# Patient Record
Sex: MALE | Race: OTHER | HISPANIC OR LATINO | URBAN - METROPOLITAN AREA
[De-identification: names, ages, dates, MRNs, and addresses within clinical notes are randomized per-mention and may not be internally consistent; named-entity substitution may affect disease eponyms.]

---

## 2020-10-06 ENCOUNTER — INPATIENT (INPATIENT)
Facility: HOSPITAL | Age: 50
LOS: 0 days | Discharge: HOME | DRG: 287 | End: 2020-10-06
Attending: INTERNAL MEDICINE | Admitting: INTERNAL MEDICINE
Payer: SELF-PAY

## 2020-10-06 ENCOUNTER — TRANSCRIPTION ENCOUNTER (OUTPATIENT)
Age: 50
End: 2020-10-06

## 2020-10-06 VITALS
HEIGHT: 64 IN | TEMPERATURE: 98 F | HEART RATE: 93 BPM | OXYGEN SATURATION: 98 % | DIASTOLIC BLOOD PRESSURE: 114 MMHG | RESPIRATION RATE: 20 BRPM | SYSTOLIC BLOOD PRESSURE: 151 MMHG | WEIGHT: 179.9 LBS

## 2020-10-06 VITALS
SYSTOLIC BLOOD PRESSURE: 148 MMHG | RESPIRATION RATE: 21 BRPM | OXYGEN SATURATION: 97 % | DIASTOLIC BLOOD PRESSURE: 80 MMHG | HEART RATE: 98 BPM

## 2020-10-06 DIAGNOSIS — I20.0 UNSTABLE ANGINA: ICD-10-CM

## 2020-10-06 DIAGNOSIS — Z98.89 OTHER SPECIFIED POSTPROCEDURAL STATES: Chronic | ICD-10-CM

## 2020-10-06 LAB
ALBUMIN SERPL ELPH-MCNC: 4.5 G/DL — SIGNIFICANT CHANGE UP (ref 3.3–5.2)
ALP SERPL-CCNC: 106 U/L — SIGNIFICANT CHANGE UP (ref 40–120)
ALT FLD-CCNC: 93 U/L — HIGH
ANION GAP SERPL CALC-SCNC: 15 MMOL/L — SIGNIFICANT CHANGE UP (ref 5–17)
APTT BLD: 33 SEC — SIGNIFICANT CHANGE UP (ref 27.5–35.5)
AST SERPL-CCNC: 71 U/L — HIGH
BASOPHILS # BLD AUTO: 0.1 K/UL — SIGNIFICANT CHANGE UP (ref 0–0.2)
BASOPHILS NFR BLD AUTO: 1.4 % — SIGNIFICANT CHANGE UP (ref 0–2)
BILIRUB SERPL-MCNC: 0.4 MG/DL — SIGNIFICANT CHANGE UP (ref 0.4–2)
BLD GP AB SCN SERPL QL: SIGNIFICANT CHANGE UP
BUN SERPL-MCNC: 6 MG/DL — LOW (ref 8–20)
CALCIUM SERPL-MCNC: 9.6 MG/DL — SIGNIFICANT CHANGE UP (ref 8.6–10.2)
CHLORIDE SERPL-SCNC: 98 MMOL/L — SIGNIFICANT CHANGE UP (ref 98–107)
CO2 SERPL-SCNC: 24 MMOL/L — SIGNIFICANT CHANGE UP (ref 22–29)
CREAT SERPL-MCNC: 0.77 MG/DL — SIGNIFICANT CHANGE UP (ref 0.5–1.3)
EOSINOPHIL # BLD AUTO: 0.13 K/UL — SIGNIFICANT CHANGE UP (ref 0–0.5)
EOSINOPHIL NFR BLD AUTO: 1.8 % — SIGNIFICANT CHANGE UP (ref 0–6)
GLUCOSE SERPL-MCNC: 137 MG/DL — HIGH (ref 70–99)
HCT VFR BLD CALC: 36.4 % — LOW (ref 39–50)
HGB BLD-MCNC: 11.8 G/DL — LOW (ref 13–17)
IMM GRANULOCYTES NFR BLD AUTO: 0.1 % — SIGNIFICANT CHANGE UP (ref 0–1.5)
INR BLD: 1.1 RATIO — SIGNIFICANT CHANGE UP (ref 0.88–1.16)
LYMPHOCYTES # BLD AUTO: 2.52 K/UL — SIGNIFICANT CHANGE UP (ref 1–3.3)
LYMPHOCYTES # BLD AUTO: 35.3 % — SIGNIFICANT CHANGE UP (ref 13–44)
MCHC RBC-ENTMCNC: 25.9 PG — LOW (ref 27–34)
MCHC RBC-ENTMCNC: 32.4 GM/DL — SIGNIFICANT CHANGE UP (ref 32–36)
MCV RBC AUTO: 80 FL — SIGNIFICANT CHANGE UP (ref 80–100)
MONOCYTES # BLD AUTO: 0.41 K/UL — SIGNIFICANT CHANGE UP (ref 0–0.9)
MONOCYTES NFR BLD AUTO: 5.7 % — SIGNIFICANT CHANGE UP (ref 2–14)
NEUTROPHILS # BLD AUTO: 3.97 K/UL — SIGNIFICANT CHANGE UP (ref 1.8–7.4)
NEUTROPHILS NFR BLD AUTO: 55.7 % — SIGNIFICANT CHANGE UP (ref 43–77)
PLATELET # BLD AUTO: 293 K/UL — SIGNIFICANT CHANGE UP (ref 150–400)
POTASSIUM SERPL-MCNC: 4.2 MMOL/L — SIGNIFICANT CHANGE UP (ref 3.5–5.3)
POTASSIUM SERPL-SCNC: 4.2 MMOL/L — SIGNIFICANT CHANGE UP (ref 3.5–5.3)
PROT SERPL-MCNC: 8.1 G/DL — SIGNIFICANT CHANGE UP (ref 6.6–8.7)
PROTHROM AB SERPL-ACNC: 12.7 SEC — SIGNIFICANT CHANGE UP (ref 10.6–13.6)
RBC # BLD: 4.55 M/UL — SIGNIFICANT CHANGE UP (ref 4.2–5.8)
RBC # FLD: 14.7 % — HIGH (ref 10.3–14.5)
SARS-COV-2 RNA SPEC QL NAA+PROBE: SIGNIFICANT CHANGE UP
SODIUM SERPL-SCNC: 137 MMOL/L — SIGNIFICANT CHANGE UP (ref 135–145)
TROPONIN T SERPL-MCNC: <0.01 NG/ML — SIGNIFICANT CHANGE UP (ref 0–0.06)
WBC # BLD: 7.14 K/UL — SIGNIFICANT CHANGE UP (ref 3.8–10.5)
WBC # FLD AUTO: 7.14 K/UL — SIGNIFICANT CHANGE UP (ref 3.8–10.5)

## 2020-10-06 PROCEDURE — 99152 MOD SED SAME PHYS/QHP 5/>YRS: CPT

## 2020-10-06 PROCEDURE — 99285 EMERGENCY DEPT VISIT HI MDM: CPT

## 2020-10-06 PROCEDURE — 86901 BLOOD TYPING SEROLOGIC RH(D): CPT

## 2020-10-06 PROCEDURE — C1887: CPT

## 2020-10-06 PROCEDURE — 93458 L HRT ARTERY/VENTRICLE ANGIO: CPT

## 2020-10-06 PROCEDURE — 86900 BLOOD TYPING SEROLOGIC ABO: CPT

## 2020-10-06 PROCEDURE — 86850 RBC ANTIBODY SCREEN: CPT

## 2020-10-06 PROCEDURE — 85610 PROTHROMBIN TIME: CPT

## 2020-10-06 PROCEDURE — 99285 EMERGENCY DEPT VISIT HI MDM: CPT | Mod: 25

## 2020-10-06 PROCEDURE — 85730 THROMBOPLASTIN TIME PARTIAL: CPT

## 2020-10-06 PROCEDURE — 93010 ELECTROCARDIOGRAM REPORT: CPT

## 2020-10-06 PROCEDURE — U0003: CPT

## 2020-10-06 PROCEDURE — 85025 COMPLETE CBC W/AUTO DIFF WBC: CPT

## 2020-10-06 PROCEDURE — C1769: CPT

## 2020-10-06 PROCEDURE — 84484 ASSAY OF TROPONIN QUANT: CPT

## 2020-10-06 PROCEDURE — 99223 1ST HOSP IP/OBS HIGH 75: CPT

## 2020-10-06 PROCEDURE — 93005 ELECTROCARDIOGRAM TRACING: CPT

## 2020-10-06 PROCEDURE — C1894: CPT

## 2020-10-06 PROCEDURE — C1760: CPT

## 2020-10-06 PROCEDURE — 71045 X-RAY EXAM CHEST 1 VIEW: CPT

## 2020-10-06 PROCEDURE — 80053 COMPREHEN METABOLIC PANEL: CPT

## 2020-10-06 PROCEDURE — 71045 X-RAY EXAM CHEST 1 VIEW: CPT | Mod: 26

## 2020-10-06 PROCEDURE — 36415 COLL VENOUS BLD VENIPUNCTURE: CPT

## 2020-10-06 RX ORDER — LOSARTAN/HYDROCHLOROTHIAZIDE 100MG-25MG
1 TABLET ORAL
Qty: 0 | Refills: 0 | DISCHARGE

## 2020-10-06 RX ORDER — DEXTROSE 50 % IN WATER 50 %
15 SYRINGE (ML) INTRAVENOUS ONCE
Refills: 0 | Status: DISCONTINUED | OUTPATIENT
Start: 2020-10-06 | End: 2020-10-06

## 2020-10-06 RX ORDER — SITAGLIPTIN 50 MG/1
1 TABLET, FILM COATED ORAL
Qty: 0 | Refills: 0 | DISCHARGE

## 2020-10-06 RX ORDER — DEXTROSE 50 % IN WATER 50 %
25 SYRINGE (ML) INTRAVENOUS ONCE
Refills: 0 | Status: DISCONTINUED | OUTPATIENT
Start: 2020-10-06 | End: 2020-10-06

## 2020-10-06 RX ORDER — METFORMIN HYDROCHLORIDE 850 MG/1
1000 TABLET ORAL
Qty: 0 | Refills: 0 | DISCHARGE

## 2020-10-06 RX ORDER — LOSARTAN POTASSIUM 100 MG/1
100 TABLET, FILM COATED ORAL
Qty: 0 | Refills: 0 | DISCHARGE

## 2020-10-06 RX ORDER — DEXTROSE 50 % IN WATER 50 %
12.5 SYRINGE (ML) INTRAVENOUS ONCE
Refills: 0 | Status: DISCONTINUED | OUTPATIENT
Start: 2020-10-06 | End: 2020-10-06

## 2020-10-06 RX ORDER — ATORVASTATIN CALCIUM 80 MG/1
1 TABLET, FILM COATED ORAL
Qty: 90 | Refills: 3
Start: 2020-10-06 | End: 2021-09-30

## 2020-10-06 RX ORDER — INSULIN LISPRO 100/ML
VIAL (ML) SUBCUTANEOUS AT BEDTIME
Refills: 0 | Status: DISCONTINUED | OUTPATIENT
Start: 2020-10-06 | End: 2020-10-06

## 2020-10-06 RX ORDER — ASPIRIN/CALCIUM CARB/MAGNESIUM 324 MG
1 TABLET ORAL
Qty: 0 | Refills: 0 | DISCHARGE
Start: 2020-10-06

## 2020-10-06 RX ORDER — ASPIRIN/CALCIUM CARB/MAGNESIUM 324 MG
81 TABLET ORAL DAILY
Refills: 0 | Status: DISCONTINUED | OUTPATIENT
Start: 2020-10-06 | End: 2020-10-06

## 2020-10-06 RX ORDER — HYDRALAZINE HCL 50 MG
10 TABLET ORAL EVERY 6 HOURS
Refills: 0 | Status: DISCONTINUED | OUTPATIENT
Start: 2020-10-06 | End: 2020-10-06

## 2020-10-06 RX ORDER — PIOGLITAZONE HYDROCHLORIDE 15 MG/1
1 TABLET ORAL
Qty: 0 | Refills: 0 | DISCHARGE

## 2020-10-06 RX ORDER — INSULIN LISPRO 100/ML
VIAL (ML) SUBCUTANEOUS
Refills: 0 | Status: DISCONTINUED | OUTPATIENT
Start: 2020-10-06 | End: 2020-10-06

## 2020-10-06 RX ORDER — SODIUM CHLORIDE 9 MG/ML
1000 INJECTION, SOLUTION INTRAVENOUS
Refills: 0 | Status: DISCONTINUED | OUTPATIENT
Start: 2020-10-06 | End: 2020-10-06

## 2020-10-06 RX ORDER — ATORVASTATIN CALCIUM 80 MG/1
40 TABLET, FILM COATED ORAL AT BEDTIME
Refills: 0 | Status: DISCONTINUED | OUTPATIENT
Start: 2020-10-06 | End: 2020-10-06

## 2020-10-06 RX ORDER — GLUCAGON INJECTION, SOLUTION 0.5 MG/.1ML
1 INJECTION, SOLUTION SUBCUTANEOUS ONCE
Refills: 0 | Status: DISCONTINUED | OUTPATIENT
Start: 2020-10-06 | End: 2020-10-06

## 2020-10-06 RX ORDER — SIMVASTATIN 20 MG/1
0 TABLET, FILM COATED ORAL
Qty: 0 | Refills: 0 | DISCHARGE

## 2020-10-06 RX ADMIN — Medication 81 MILLIGRAM(S): at 15:46

## 2020-10-06 RX ADMIN — ATORVASTATIN CALCIUM 40 MILLIGRAM(S): 80 TABLET, FILM COATED ORAL at 21:25

## 2020-10-06 NOTE — H&P ADULT - ASSESSMENT
48 yo male with history of DM, HTN and HLD (improved with statin, he stopped taking this) who presents after being told to come to the ED by his cardiologist after positive stress test on 10/6.    #chest pain; r/o ACS (positive stress test)  - for cardiac cath presumably tomorrow  - NPO after midnight  - admit to tele  - cbc, bmp in the AM  - currently without chest pain  - EKG with PVC but otherwise no significant change since 2016    #HTN  - hold losartan/HCTZ for now  - hydralazine PRN    #DM  - SARAH with FS TIDAC  - DASH/CC diet  - A1c in the AM    #HLD  - would continue atorvastatin for now 40mg daily    #DVT ppx - lovenox daily

## 2020-10-06 NOTE — H&P ADULT - HISTORY OF PRESENT ILLNESS
50 yo male with history of DM, HTN and HLD (improved with statin, he stopped taking this) who presents after being told to come to the ED by his cardiologist after positive stress test today. Patient was referred to cardiology on Friday after seeing his PCP reporting on and off chest pains for the past 3-4 months. Patient reports that chest pain comes with activity but sometimes can occur at rest as well (doesn't challenge himself on stairs or long walks/runs but with pushups chest pain occurs). Patient denies diaphoresis, SOB associated. Denies lower ext edema, PND or orthopnea. Denies fever, chills. Denies cough. Patient in the ED at this time denies chest pain. Reports that typical pattern is on the left chest wall with radiation to the left arm.

## 2020-10-06 NOTE — DISCHARGE NOTE PROVIDER - NSDCCPCAREPLAN_GEN_ALL_CORE_FT
PRINCIPAL DISCHARGE DIAGNOSIS  Diagnosis: Unstable angina  Assessment and Plan of Treatment: You had a cardiac catheterization with Dr. Nicolas  No interventions were performed during this procedure  You are to start lipitor; a new prescription was sent to your pharmacy  You are to stop taking simvastatin  Continue taking ASA 81mg daily.  Resume your metformin on 10/8/20  Do not stop taking these medications without first speaking with your cardiologist.  Follow up with your cardiologist in 2 weeks.

## 2020-10-06 NOTE — DISCHARGE NOTE PROVIDER - NSDCCPTREATMENT_GEN_ALL_CORE_FT
PRINCIPAL PROCEDURE  Procedure: Cardiac catheterization, left heart  Findings and Treatment: No heavy lifting, driving, sex, tub baths, swimming, or any activity that submerges the lower half of the body in water for 48 hours.  Limited walking and stairs for 48 hours.    Change the bandaid after 24 hours and every 24 hours after that.  Keep the puncture site dry and covered with a bandaid until a scab forms.    Observe the site frequently.  If bleeding or a large lump (the size of a golf ball or bigger) occurs lie flat, apply continuous direct pressure just above the puncture site for at least 10 minutes, and notify your physician immediately.  If the bleeding cannot be controlled, call 911 immediately for assistance.  Notify your physician of pain, swelling or any drainage.    Notify your physician immediately if coldness, numbness, discoloration or pain in your foot occurs.

## 2020-10-06 NOTE — DISCHARGE NOTE PROVIDER - NSDCMRMEDTOKEN_GEN_ALL_CORE_FT
Januvia 100 mg oral tablet: 1 tab(s) orally once a day  losartan-hydrochlorothiazide 100mg-12.5mg oral tablet: 1 tab(s) orally once a day  metFORMIN: 1000 milligram(s) orally 2 times a day  simvastatin:    Actos 15 mg oral tablet: 1 tab(s) orally once a day  aspirin 81 mg oral tablet, chewable: 1 tab(s) orally once a day  atorvastatin 40 mg oral tablet: 1 tab(s) orally once a day (at bedtime)  losartan-hydrochlorothiazide 100mg-12.5mg oral tablet: 1 tab(s) orally once a day  metFORMIN: 1000 milligram(s) orally 2 times a day  Start on 10/8/2020

## 2020-10-06 NOTE — H&P ADULT - NSHPLABSRESULTS_GEN_ALL_CORE
11.8   7.14  )-----------( 293      ( 06 Oct 2020 15:19 )             36.4     10-06    137  |  98  |  6.0<L>  ----------------------------<  137<H>  4.2   |  24.0  |  0.77    Ca    9.6      06 Oct 2020 15:19    TPro  8.1  /  Alb  4.5  /  TBili  0.4  /  DBili  x   /  AST  71<H>  /  ALT  93<H>  /  AlkPhos  106  10-06

## 2020-10-06 NOTE — DISCHARGE NOTE NURSING/CASE MANAGEMENT/SOCIAL WORK - PATIENT PORTAL LINK FT
You can access the FollowMyHealth Patient Portal offered by [facility] by registering at the following website: [email or web address]. By joining [facility]’s FollowMyHealth portal, you will also be able to view your health information using other applications (apps) compatible with our system.

## 2020-10-06 NOTE — DISCHARGE NOTE PROVIDER - HOSPITAL COURSE
HPI:  50 yo male with history of DM, HTN and HLD (improved with statin, he stopped taking this) who presents after being told to come to the ED by his cardiologist after positive stress test today. Patient was referred to cardiology on Friday after seeing his PCP reporting on and off chest pains for the past 3-4 months. Patient reports that chest pain comes with activity but sometimes can occur at rest as well (doesn't challenge himself on stairs or long walks/runs but with pushups chest pain occurs). Patient denies diaphoresis, SOB associated. Denies lower ext edema, PND or orthopnea. Denies fever, chills. Denies cough. Patient in the ED at this time denies chest pain. Reports that typical pattern is on the left chest wall with radiation to the left arm.     He is now s/p LHC with Dr. Nicolas.  No interventions were performed.  Aggressive medical therapy recommended. HPI:  48 yo male with history of DM, HTN and HLD (improved with statin, he stopped taking this) who presents after being told to come to the ED by his cardiologist after positive stress test today. Patient was referred to cardiology on Friday after seeing his PCP reporting on and off chest pains for the past 3-4 months. Patient reports that chest pain comes with activity but sometimes can occur at rest as well (doesn't challenge himself on stairs or long walks/runs but with pushups chest pain occurs). Patient denies diaphoresis, SOB associated. Denies lower ext edema, PND or orthopnea. Denies fever, chills. Denies cough. Patient in the ED at this time denies chest pain. Reports that typical pattern is on the left chest wall with radiation to the left arm.     He is now s/p LHC with Dr. Nicolas.  No interventions were performed.  Aggressive medical therapy recommended.  Lipitor added, simvastatin added and continue with single antiplatelet therapy with ASA 81mg.  Restart metformin on 10/8/20

## 2020-10-06 NOTE — PROGRESS NOTE ADULT - SUBJECTIVE AND OBJECTIVE BOX
Cardiology NP preprocedure note:     -For [facility] with Dr. Nicolas    EKG:  bpm occ PVCs  TELE: NSR    MEDICATIONS  (STANDING):  aspirin  chewable 81 milliGRAM(s) Oral daily  atorvastatin 40 milliGRAM(s) Oral at bedtime  dextrose 5%. 1000 milliLiter(s) (50 mL/Hr) IV Continuous <Continuous>  dextrose 50% Injectable 12.5 Gram(s) IV Push once  dextrose 50% Injectable 25 Gram(s) IV Push once  dextrose 50% Injectable 25 Gram(s) IV Push once  insulin lispro (HumaLOG) corrective regimen sliding scale   SubCutaneous three times a day before meals  insulin lispro (HumaLOG) corrective regimen sliding scale   SubCutaneous at bedtime    MEDICATIONS  (PRN):  dextrose 40% Gel 15 Gram(s) Oral once PRN Blood Glucose LESS THAN 70 milliGRAM(s)/deciliter  glucagon  Injectable 1 milliGRAM(s) IntraMuscular once PRN Glucose LESS THAN 70 milligrams/deciliter  hydrALAZINE Injectable 10 milliGRAM(s) IV Push every 6 hours PRN systolic >180 or diastolic >100      Allergies:  Allergy Status Unknown      PAST MEDICAL & SURGICAL HISTORY:  Hyperlipidemia    Diabetes    HTN (hypertension)    History of appendectomy        Vital Signs Last 24 Hrs  T(C): 36.4 (06 Oct 2020 15:23), Max: 36.9 (06 Oct 2020 14:10)  T(F): 97.5 (06 Oct 2020 15:23), Max: 98.4 (06 Oct 2020 14:10)  HR: 86 (06 Oct 2020 16:46) (86 - 93)  BP: 143/87 (06 Oct 2020 16:46) (131/85 - 151/114)  BP(mean): 102 (06 Oct 2020 15:23) (102 - 102)  RR: 17 (06 Oct 2020 16:46) (17 - 20)  SpO2: 96% (06 Oct 2020 16:46) (96% - 98%)    Physical Exam:  Constitutional: NAD, AAOx3  Cardiovascular: +S1S2 RRR  Pulmonary: CTA b/l, unlabored  GI: soft NTND +BS  Extremities: no pedal edema, +distal pulses b/l  Neuro: non focal, FOWLER x4  ASA 2  Mallampati 2    BRA 0.9%    LABS:                        11.8   7.14  )-----------( 293      ( 06 Oct 2020 15:19 )             36.4     10-06    137  |  98  |  6.0<L>  ----------------------------<  137<H>  4.2   |  24.0  |  0.77    Ca    9.6      06 Oct 2020 15:19    TPro  8.1  /  Alb  4.5  /  TBili  0.4  /  DBili  x   /  AST  71<H>  /  ALT  93<H>  /  AlkPhos  106  10-06    PT/INR - ( 06 Oct 2020 15:19 )   PT: 12.7 sec;   INR: 1.10 ratio         PTT - ( 06 Oct 2020 15:19 )  PTT:33.0 sec      RADIOLOGY & ADDITIONAL TESTS:

## 2020-10-06 NOTE — ED ADULT TRIAGE NOTE - CHIEF COMPLAINT QUOTE
Pt arrives from cardiologist office for abnormal stress test , needs inpatient cath as per referral , c/o chest pressure at present, STAT EKG obtained

## 2020-10-06 NOTE — ED PROVIDER NOTE - OBJECTIVE STATEMENT
48 y/o M pt with hx of HTn, HLD, DM presenting today from cardiologists office after having a positive stress test. Note from Dr. Bradshaw states to admit for inpatient cath. Currently pt is c/o Lsided chest pain with radiation down the L arm that started while on the treadmill today. Pt denies any dyspnea, fevers, n/v/d, abdominal pain, dysuria, headache, cough, congestion, sore throat, neck pain, back pain, weakness, numbness, tingling, dizziness, syncope, or other complaint.

## 2020-10-06 NOTE — PROGRESS NOTE ADULT - SUBJECTIVE AND OBJECTIVE BOX
Department of Cardiology                                                                  [facility]/[place] [address] [address] [place]                                                            Telephone: [phone]. Fax:[phone]                                                    Post- Procedure Note: Left Heart Cardiac Catheterization       Narrative:  49y  Male is now s/p left heart catheterization via right groin approach with Dr. Nicolas    No intervention was performed during this procedure.  Total Dye used 70 ml  Angioseal to R Groin    Snapshot /68/65  Snapshot /0/5    < from: Cardiac Cath Lab - Adult (10.06.20 @ 18:50) >  VENTRICLES: There were no left ventricular global or regional wall motion  abnormalities. Global left ventricular function was normal. EF estimated  was 65 %.  VALVES: MITRAL VALVE: The mitral valve exhibited trivial regurgitation  (less than 1+).  CORONARY VESSELS: The coronary circulation is left dominant.  LM:   --  LM: Normal.  LAD:   --  Mid LAD: Angiography showed mild atherosclerosis with no flow  limiting lesions.  --  Distal LAD: Angiography showed minor luminal irregularities with no  flow limiting lesions.  CX:   --  Circumflex: Normal.  RCA:   --  RCA: Normal.  COMPLICATIONS: No complications occurred during the cath lab visit.  DIAGNOSTIC RECOMMENDATIONS: Patient management should include aggressive  medical therapy and an exercise program. The patient should follow a low  sodium and low fat diet.               PAST MEDICAL & SURGICAL HISTORY:  Hyperlipidemia    Diabetes    HTN (hypertension)    History of appendectomy      FAMILY HISTORY:  No pertinent family history in first degree relatives      Home Medications:  Januvia 100 mg oral tablet: 1 tab(s) orally once a day (06 Oct 2020 18:11)  losartan-hydrochlorothiazide 100mg-12.5mg oral tablet: 1 tab(s) orally once a day (06 Oct 2020 18:11)  metFORMIN: 1000 milligram(s) orally 2 times a day (06 Oct 2020 18:11)  simvastatin:  (06 Oct 2020 18:11)    Patient is a 49y old  Male who presents with a chief complaint of chest pain (06 Oct 2020 17:58)    HEALTH ISSUES - PROBLEM Dx:        HPI:  50 yo male with history of DM, HTN and HLD (improved with statin, he stopped taking this) who presents after being told to come to the ED by his cardiologist after positive stress test today. Patient was referred to cardiology on Friday after seeing his PCP reporting on and off chest pains for the past 3-4 months. Patient reports that chest pain comes with activity but sometimes can occur at rest as well (doesn't challenge himself on stairs or long walks/runs but with pushups chest pain occurs). Patient denies diaphoresis, SOB associated. Denies lower ext edema, PND or orthopnea. Denies fever, chills. Denies cough. Patient in the ED at this time denies chest pain. Reports that typical pattern is on the left chest wall with radiation to the left arm. (06 Oct 2020 16:15)    General: No fatigue, no fevers/chills  Respiratory: No dyspnea, no cough, no wheeze  CV: No chest pain, no palpitations  Abd: No nausea  Neuro: No headache, no dizziness  Allergy Status Unknown      Objective:  Vital Signs Last 24 Hrs  T(C): 36.8 (06 Oct 2020 18:11), Max: 36.9 (06 Oct 2020 14:10)  T(F): 98.3 (06 Oct 2020 18:11), Max: 98.4 (06 Oct 2020 14:10)  HR: 88 (06 Oct 2020 18:11) (86 - 93)  BP: 151/88 (06 Oct 2020 18:11) (131/85 - 151/114)  BP(mean): 102 (06 Oct 2020 15:23) (102 - 102)  RR: 22 (06 Oct 2020 18:11) (17 - 22)  SpO2: 98% (06 Oct 2020 18:11) (96% - 98%)    CM: SR  Neuro: A&OX3, CN 2-12 intact  HEENT: NC, AT  Lungs: CTA B/L  CV: S1, S2, no murmur, RRR  Abd: Soft  Right Groin: Soft, no bleeding, no hematoma  Extremity: + distal pulses                        11.8   7.14  )-----------( 293      ( 06 Oct 2020 15:19 )             36.4     10-06    137  |  98  |  6.0<L>  ----------------------------<  137<H>  4.2   |  24.0  |  0.77    Ca    9.6      06 Oct 2020 15:19    TPro  8.1  /  Alb  4.5  /  TBili  0.4  /  DBili  x   /  AST  71<H>  /  ALT  93<H>  /  AlkPhos  106  10-06    PT/INR - ( 06 Oct 2020 15:19 )   PT: 12.7 sec;   INR: 1.10 ratio         PTT - ( 06 Oct 2020 15:19 )  PTT:33.0 sec       Department of Cardiology                                                                  [facility]/[place] [address] [address]                                                            Telephone: [phone]. Fax:[phone]                                                    Post- Procedure Note: Left Heart Cardiac Catheterization       Narrative:  49y  Male is now s/p left heart catheterization via right groin approach with Dr. Nicolas    No intervention was performed during this procedure.  Total Dye used 70 ml  Angioseal to R Groin    < from: Cardiac Cath Lab - Adult (10.06.20 @ 18:50) >  VENTRICLES: There were no left ventricular global or regional wall motion  abnormalities. Global left ventricular function was normal. EF estimated  was 65 %.  VALVES: MITRAL VALVE: The mitral valve exhibited trivial regurgitation  (less than 1+).  CORONARY VESSELS: The coronary circulation is left dominant.  LM:   --  LM: Normal.  LAD:   --  Mid LAD: Angiography showed mild atherosclerosis with no flow  limiting lesions.  --  Distal LAD: Angiography showed minor luminal irregularities with no  flow limiting lesions.  CX:   --  Circumflex: Normal.  RCA:   --  RCA: Normal.  COMPLICATIONS: No complications occurred during the cath lab visit.  DIAGNOSTIC RECOMMENDATIONS: Patient management should include aggressive  medical therapy and an exercise program. The patient should follow a low  sodium and low fat diet.               PAST MEDICAL & SURGICAL HISTORY:  Hyperlipidemia    Diabetes    HTN (hypertension)    History of appendectomy      FAMILY HISTORY:  No pertinent family history in first degree relatives      Home Medications:  Januvia 100 mg oral tablet: 1 tab(s) orally once a day (06 Oct 2020 18:11)  losartan-hydrochlorothiazide 100mg-12.5mg oral tablet: 1 tab(s) orally once a day (06 Oct 2020 18:11)  metFORMIN: 1000 milligram(s) orally 2 times a day (06 Oct 2020 18:11)  simvastatin:  (06 Oct 2020 18:11)    Patient is a 49y old  Male who presents with a chief complaint of chest pain (06 Oct 2020 17:58)    HEALTH ISSUES - PROBLEM Dx:        HPI:  48 yo male with history of DM, HTN and HLD (improved with statin, he stopped taking this) who presents after being told to come to the ED by his cardiologist after positive stress test today. Patient was referred to cardiology on Friday after seeing his PCP reporting on and off chest pains for the past 3-4 months. Patient reports that chest pain comes with activity but sometimes can occur at rest as well (doesn't challenge himself on stairs or long walks/runs but with pushups chest pain occurs). Patient denies diaphoresis, SOB associated. Denies lower ext edema, PND or orthopnea. Denies fever, chills. Denies cough. Patient in the ED at this time denies chest pain. Reports that typical pattern is on the left chest wall with radiation to the left arm. (06 Oct 2020 16:15)    General: No fatigue, no fevers/chills  Respiratory: No dyspnea, no cough, no wheeze  CV: No chest pain, no palpitations  Abd: No nausea  Neuro: No headache, no dizziness  Allergy Status Unknown      Objective:  Vital Signs Last 24 Hrs  T(C): 36.8 (06 Oct 2020 18:11), Max: 36.9 (06 Oct 2020 14:10)  T(F): 98.3 (06 Oct 2020 18:11), Max: 98.4 (06 Oct 2020 14:10)  HR: 88 (06 Oct 2020 18:11) (86 - 93)  BP: 151/88 (06 Oct 2020 18:11) (131/85 - 151/114)  BP(mean): 102 (06 Oct 2020 15:23) (102 - 102)  RR: 22 (06 Oct 2020 18:11) (17 - 22)  SpO2: 98% (06 Oct 2020 18:11) (96% - 98%)    CM: SR  Neuro: A&OX3, CN 2-12 intact  HEENT: NC, AT  Lungs: CTA B/L  CV: S1, S2, no murmur, RRR  Abd: Soft  Right Groin: Soft, no bleeding, no hematoma  Extremity: + distal pulses                        11.8   7.14  )-----------( 293      ( 06 Oct 2020 15:19 )             36.4     10-06    137  |  98  |  6.0<L>  ----------------------------<  137<H>  4.2   |  24.0  |  0.77    Ca    9.6      06 Oct 2020 15:19    TPro  8.1  /  Alb  4.5  /  TBili  0.4  /  DBili  x   /  AST  71<H>  /  ALT  93<H>  /  AlkPhos  106  10-06    PT/INR - ( 06 Oct 2020 15:19 )   PT: 12.7 sec;   INR: 1.10 ratio         PTT - ( 06 Oct 2020 15:19 )  PTT:33.0 sec

## 2020-10-06 NOTE — ED PROVIDER NOTE - PROGRESS NOTE DETAILS
Reviewed all results with pt as well as plans for admission. Pt is comfortable with plan for admission. Questions answered. - Mikhail Yeh, PGY-2

## 2020-10-06 NOTE — ED PROVIDER NOTE - ATTENDING CONTRIBUTION TO CARE
pt comes in for admission , was having a stress test with chest pain radiating to left arm   currently with chest pain   labs ekg    admission for cath

## 2020-10-06 NOTE — ED ADULT NURSE NOTE - CHPI ED NUR SYMPTOMS NEG
no dizziness/no shortness of breath/no fever/no nausea/no chills/no back pain/no syncope/no diaphoresis/no vomiting/no congestion

## 2020-10-06 NOTE — PROGRESS NOTE ADULT - ASSESSMENT
A/P: 48 yo male with history of DM, HTN and HLD (improved with statin, he stopped taking this) who presents after being told to come to the ED by his cardiologist after positive stress test today. Patient was referred to cardiology on Friday after seeing his PCP reporting on and off chest pains for the past 3-4 months. Patient reports that chest pain comes with activity but sometimes can occur at rest as well (doesn't challenge himself on stairs or long walks/runs but with pushups chest pain occurs). Patient denies diaphoresis, SOB associated. Denies lower ext edema, PND or orthopnea. Denies fever, chills. Denies cough. Patient in the ED at this time denies chest pain. Reports that typical pattern is on the left chest wall with radiation to the left arm.  Trop negative x 1.   Presents for [facility] with Dr. Nicolas to r/o obstructive CAD.   A/P: 48 yo male with history of DM, HTN and HLD (improved with statin, he stopped taking this) who presents after being told to come to the ED by his cardiologist after positive stress test today. Patient was referred to cardiology on Friday after seeing his PCP reporting on and off chest pains for the past 3-4 months. Patient reports that chest pain comes with activity but sometimes can occur at rest as well (doesn't challenge himself on stairs or long walks/runs but with pushups chest pain occurs). Patient denies diaphoresis, SOB associated. Denies lower ext edema, PND or orthopnea. Denies fever, chills. Denies cough. Patient in the ED at this time denies chest pain. Reports that typical pattern is on the left chest wall with radiation to the left arm.  Trop negative x 1.   Presents for [facility] with Dr. Nicolas to r/o obstructive CAD.  -NPO for procedure

## 2020-10-06 NOTE — PROGRESS NOTE ADULT - ASSESSMENT
49y  Male is now s/p left heart catheterization via right groin approach with Dr. Nicolas    -groin precautions  -bedrest x 1 hour post procedure  -continue current medical therapy  -Dual anti platelet therapy with aspirin/ plavix (brilinta)  -statin therapy  -beta blocker  -follow up outpt in 2 weeks with Cardiologist  -Discharge when groin site stable.  From cardiac standpoint, patient stable for discharge 49y  Male is now s/p left heart catheterization via right groin approach with Dr. Nicolas    -groin precautions  -bedrest x 1 hour post procedure  -continue current medical therapy  aggressive medical management  -statin therapy  -follow up outpt in 2 weeks with Cardiologist  -From cardiac standpoint, patient stable for discharge 49y  Male is now s/p left heart catheterization via right groin approach with Dr. Nicolas    -groin precautions  -bedrest x 1 hour post procedure  -continue current medical therapy  -aggressive medical management  -Lipitor 40, stop simvastatin  -single antiplatelet therapy with ASA 81mg  -follow up outpt in 2 weeks with Cardiologist  -From cardiac standpoint, patient stable for discharge

## 2020-10-06 NOTE — DISCHARGE NOTE PROVIDER - CARE PROVIDER_API CALL
Hemal Nicolas  CARDIOVASCULAR DISEASE  [address] [place] [address]  Phone: [phone]  Fax: [phone]  Established Patient  Follow Up Time: 2 weeks

## 2020-10-06 NOTE — ED ADULT NURSE NOTE - OBJECTIVE STATEMENT
Pt. presents alert and oriented x 4 to ED sent in for cardiac cath by cardiologist. Pt. states he has 2/10 intermittent burning chest pain in his L. pectoral radiating to the L. shoulder/arm "that I've had for a while." Pt. denies new onset chest pain. Pt. states he had been telling his PCP "for a while" about the pain and had EKG's but was told it was probably muscular. Pt. was sent to follow up with cardiologist by PCP several weeks ago and had TTE. Today pt. had stress test at cardiologist and was sent to ED due to abnormal result, was told he needs a cardiac cath. Pt. in NSR on cardiac monitor, VSS, normotensive. Breathing spontaneous and unlabored on room air. Pt. FOWLER, ambulatory, cap refill < 2 seconds. No noted diaphoresis or pallor. Pt. denies N/V, syncope, dizziness. Pt. is smiling, calm, cooperative.

## 2020-10-06 NOTE — ED PROVIDER NOTE - CLINICAL SUMMARY MEDICAL DECISION MAKING FREE TEXT BOX
Pt with hx of HTN, HLD, DM presenting after +stress test for cath. Will order labs, ekg, trop, cxr, cardio cx, and admit.

## 2020-10-06 NOTE — H&P ADULT - NSHPPHYSICALEXAM_GEN_ALL_CORE
PHYSICAL EXAM:  General: Well developed; well nourished; in no acute distress  Eyes: PERRLA, EOMI; conjunctiva and sclera clear  Head: Normocephalic; atraumatic  ENMT: No nasal discharge; airway clear  Neck: Supple; non tender; no masses  Respiratory: No wheezes, rales or rhonchi  Cardiovascular: Regular rate and rhythm. S1 and S2 Normal; No murmurs, gallops or rubs  Gastrointestinal: Soft non-tender non-distended; Normal bowel sounds  Genitourinary: No costovertebral angle tenderness  Extremities: Normal range of motion, No clubbing, cyanosis or edema  Vascular: Peripheral pulses palpable 2+ bilaterally  Neurological: Alert and oriented x4  Skin: Warm and dry. No acute rash  Lymph Nodes: No acute cervical adenopathy  Musculoskeletal: Normal gait, tone, without deformities  Psychiatric: Cooperative and appropriate

## 2021-03-20 NOTE — ED PROVIDER NOTE - NS ED MD DISPO ISOLATION TYPES
- on ninlaro/dexamethasone, follows with Dr. Valdo Simons  - per heme, no currently inpatient workup or treatment None

## 2021-05-25 ENCOUNTER — TRANSCRIPTION ENCOUNTER (OUTPATIENT)
Age: 51
End: 2021-05-25

## 2021-05-25 ENCOUNTER — APPOINTMENT (OUTPATIENT)
Dept: GASTROENTEROLOGY | Facility: CLINIC | Age: 51
End: 2021-05-25
Payer: COMMERCIAL

## 2021-05-25 DIAGNOSIS — Z86.79 PERSONAL HISTORY OF OTHER DISEASES OF THE CIRCULATORY SYSTEM: ICD-10-CM

## 2021-05-25 DIAGNOSIS — E66.9 OBESITY, UNSPECIFIED: ICD-10-CM

## 2021-05-25 DIAGNOSIS — Z86.39 PERSONAL HISTORY OF OTHER ENDOCRINE, NUTRITIONAL AND METABOLIC DISEASE: ICD-10-CM

## 2021-05-25 PROCEDURE — 99204 OFFICE O/P NEW MOD 45 MIN: CPT

## 2021-05-25 NOTE — CONSULT LETTER
[Dear  ___] : Dear  [unfilled], [Consult Letter:] : I had the pleasure of evaluating your patient, [unfilled]. [Please see my note below.] : Please see my note below. [Consult Closing:] : Thank you very much for allowing me to participate in the care of this patient.  If you have any questions, please do not hesitate to contact me. [Sincerely,] : Sincerely, [FreeTextEntry1] : Iron deficiency anemia.  Gross hematochezia are episodic.  Heartburn, regurgitation, borborygmi and right upper quadrant pain.  Arrangements made for colonoscopy and endoscopy exam and right upper quadrant sonogram to exclude gallstones.  Results to follow [FreeTextEntry3] : Tuan Brasher MD FACG\par Diplomate American Board of Internal Medicine and Gastroenterolgy\par [facility]\par

## 2021-05-25 NOTE — ASSESSMENT
[FreeTextEntry1] : Ongoing persistence low-volume hematochezia.  Chronic constipation.  No obvious hemorrhoids.  No palpable rectal lesions on exam.  Recent development of iron deficiency anemia.  Very recently transfused 2 units of Venofer.  Globin now acceptable in the 10 range.  Record release to Dr. Chris Yanez for results of recent blood work.\par Need to exclude colorectal neoplasia.  Colonoscopy is indicated.  The procedure, its risk benefits and prep, were explained to the patient, who understands and is agreeable to proceed.  ASA #3.  Blood work will be performed prior to the procedure to include CBC, CMP, coagulation profiles.\par   Also noted to have chronic heartburn and dyspepsia and borborygmi.    An upper endoscopy is indicated to exclude peptic ulcer disease.  The procedure, its risk benefits and prep, were explained to the patient, who understands and is agreeable to proceed.  ASA #3.  Mallampati #2.  Both procedures on same day.  No clearance is required.  Advised to stop any iron supplementation 1 week prior to the procedures.  \par Patient is in optimal medical condition to undergo planned procedures.  No clearance is required.  MiraLAX prep to be utilized.   Arrangements made.  Results to follow.\par \par Patient also complains of right upper quadrant focal postprandial abdominal pain.  Need to exclude gallstones as the cause.  Abdominal sonogram requested.

## 2021-05-25 NOTE — PHYSICAL EXAM
[General Appearance - Alert] : alert [General Appearance - In No Acute Distress] : in no acute distress [Sclera] : the sclera and conjunctiva were normal [PERRL With Normal Accommodation] : pupils were equal in size, round, and reactive to light [Extraocular Movements] : extraocular movements were intact [Outer Ear] : the ears and nose were normal in appearance [Oropharynx] : the oropharynx was normal [Neck Appearance] : the appearance of the neck was normal [Neck Cervical Mass (___cm)] : no neck mass was observed [Jugular Venous Distention Increased] : there was no jugular-venous distention [Thyroid Diffuse Enlargement] : the thyroid was not enlarged [Thyroid Nodule] : there were no palpable thyroid nodules [Auscultation Breath Sounds / Voice Sounds] : lungs were clear to auscultation bilaterally [Heart Rate And Rhythm] : heart rate was normal and rhythm regular [Heart Sounds] : normal S1 and S2 [Heart Sounds Gallop] : no gallops [Murmurs] : no murmurs [Heart Sounds Pericardial Friction Rub] : no pericardial rub [Bowel Sounds] : normal bowel sounds [Abdomen Soft] : soft [Abdomen Tenderness] : non-tender [Abdomen Mass (___ Cm)] : no abdominal mass palpated [Normal Sphincter Tone] : normal sphincter tone [No Rectal Mass] : no rectal mass [Internal Hemorrhoid] : no internal hemorrhoids [External Hemorrhoid] : no external hemorrhoids [Occult Blood Positive] : stool was negative for occult blood [FreeTextEntry1] : Empty rectal vault.  No stool.  No blood.  No lesions. [No CVA Tenderness] : no ~M costovertebral angle tenderness [No Spinal Tenderness] : no spinal tenderness [Abnormal Walk] : normal gait [Nail Clubbing] : no clubbing  or cyanosis of the fingernails [Musculoskeletal - Swelling] : no joint swelling seen [Motor Tone] : muscle strength and tone were normal [Skin Color & Pigmentation] : normal skin color and pigmentation [Skin Turgor] : normal skin turgor [] : no rash

## 2021-05-25 NOTE — REASON FOR VISIT
[Consultation] : a consultation visit [FreeTextEntry1] : Iron deficiency anemia recent Venofer infusions.  Upper abdominal pain

## 2021-05-25 NOTE — HISTORY OF PRESENT ILLNESS
[FreeTextEntry1] : 50-year-old white male  this presents with hypertension diabetes hyperlipidemia on multiple medications including multiple diabetic medications.  On chronic aspirin therapy along with omeprazole.  Patient had suffered a Covid infection in 2/21 but did not require hospitalization and improved spontaneously.  He was never seriously ill with the Covid infection fortunately.  Patient reports having chronic constipation he strains at his stools on a daily basis but usually is able to have a small bowel movement.  He has had episodic hematochezia for the past 20 years.  This occurs several times per month and is usually low-volume with the bleeding.  His worst bleeding was in 2016 where he required a blood transfusion.  He has had 2 prior colonoscopies the most recent was in 2012 at [facility] where he was told to have polyps.  Official report will not be forthcoming.  Recently he has received infusions of Venofer via hematologist Chris Montaño on 422 and 430.  Patient also complains of postprandial abdominal fullness and borborygmi.  Some symptoms of early satiety.  He denies having heartburn or dyspepsia or odynophagia.  No dysphagia.  No weight loss.  He is also experiencing episodic right upper quadrant abdominal pain in a focal location directly over the gallbladder bladder.  No prior investigation for this complaint.\par \par Patient reports that he had undergone cardiac evaluation in 10/20 including a cardiac catheterization which showed no evidence of significant coronary artery disease.

## 2021-06-05 ENCOUNTER — APPOINTMENT (OUTPATIENT)
Dept: ULTRASOUND IMAGING | Facility: CLINIC | Age: 51
End: 2021-06-05
Payer: COMMERCIAL

## 2021-06-05 ENCOUNTER — RESULT REVIEW (OUTPATIENT)
Age: 51
End: 2021-06-05

## 2021-06-05 ENCOUNTER — OUTPATIENT (OUTPATIENT)
Dept: OUTPATIENT SERVICES | Facility: HOSPITAL | Age: 51
LOS: 1 days | End: 2021-06-05
Payer: COMMERCIAL

## 2021-06-05 DIAGNOSIS — R10.11 RIGHT UPPER QUADRANT PAIN: ICD-10-CM

## 2021-06-05 DIAGNOSIS — Z98.89 OTHER SPECIFIED POSTPROCEDURAL STATES: Chronic | ICD-10-CM

## 2021-06-05 PROCEDURE — 76700 US EXAM ABDOM COMPLETE: CPT

## 2021-06-05 PROCEDURE — 76700 US EXAM ABDOM COMPLETE: CPT | Mod: 26

## 2021-06-10 LAB
ALBUMIN SERPL ELPH-MCNC: 5.5 G/DL
ALP BLD-CCNC: 107 U/L
ALT SERPL-CCNC: 85 U/L
ANION GAP SERPL CALC-SCNC: 17 MMOL/L
AST SERPL-CCNC: 49 U/L
BASOPHILS # BLD AUTO: 0.07 K/UL
BASOPHILS NFR BLD AUTO: 0.7 %
BILIRUB SERPL-MCNC: 0.4 MG/DL
BUN SERPL-MCNC: 14 MG/DL
CALCIUM SERPL-MCNC: 10.9 MG/DL
CHLORIDE SERPL-SCNC: 99 MMOL/L
CO2 SERPL-SCNC: 24 MMOL/L
CREAT SERPL-MCNC: 1.15 MG/DL
EOSINOPHIL # BLD AUTO: 0.06 K/UL
EOSINOPHIL NFR BLD AUTO: 0.6 %
GLUCOSE SERPL-MCNC: 154 MG/DL
HCT VFR BLD CALC: 47 %
HGB BLD-MCNC: 15.4 G/DL
IMM GRANULOCYTES NFR BLD AUTO: 0.2 %
INR PPP: 0.93 RATIO
LYMPHOCYTES # BLD AUTO: 2.74 K/UL
LYMPHOCYTES NFR BLD AUTO: 29 %
MAN DIFF?: NORMAL
MCHC RBC-ENTMCNC: 27 PG
MCHC RBC-ENTMCNC: 32.8 GM/DL
MCV RBC AUTO: 82.3 FL
MONOCYTES # BLD AUTO: 0.41 K/UL
MONOCYTES NFR BLD AUTO: 4.3 %
NEUTROPHILS # BLD AUTO: 6.16 K/UL
NEUTROPHILS NFR BLD AUTO: 65.2 %
PLATELET # BLD AUTO: 313 K/UL
POTASSIUM SERPL-SCNC: 4.7 MMOL/L
PROT SERPL-MCNC: 8.7 G/DL
PT BLD: 11.1 SEC
RBC # BLD: 5.71 M/UL
RBC # FLD: 17.5 %
SODIUM SERPL-SCNC: 140 MMOL/L
WBC # FLD AUTO: 9.46 K/UL

## 2021-06-22 ENCOUNTER — TRANSCRIPTION ENCOUNTER (OUTPATIENT)
Age: 51
End: 2021-06-22

## 2021-06-23 ENCOUNTER — APPOINTMENT (OUTPATIENT)
Dept: GASTROENTEROLOGY | Facility: GI CENTER | Age: 51
End: 2021-06-23
Payer: COMMERCIAL

## 2021-06-23 ENCOUNTER — OUTPATIENT (OUTPATIENT)
Dept: OUTPATIENT SERVICES | Facility: HOSPITAL | Age: 51
LOS: 1 days | End: 2021-06-23
Payer: COMMERCIAL

## 2021-06-23 ENCOUNTER — RESULT REVIEW (OUTPATIENT)
Age: 51
End: 2021-06-23

## 2021-06-23 DIAGNOSIS — K59.09 OTHER CONSTIPATION: ICD-10-CM

## 2021-06-23 DIAGNOSIS — Z98.89 OTHER SPECIFIED POSTPROCEDURAL STATES: Chronic | ICD-10-CM

## 2021-06-23 DIAGNOSIS — R10.11 RIGHT UPPER QUADRANT PAIN: ICD-10-CM

## 2021-06-23 LAB — GLUCOSE BLDC GLUCOMTR-MCNC: 145 MG/DL — HIGH (ref 70–99)

## 2021-06-23 PROCEDURE — 43239 EGD BIOPSY SINGLE/MULTIPLE: CPT | Mod: 59

## 2021-06-23 PROCEDURE — 88305 TISSUE EXAM BY PATHOLOGIST: CPT

## 2021-06-23 PROCEDURE — 43239 EGD BIOPSY SINGLE/MULTIPLE: CPT

## 2021-06-23 PROCEDURE — 45378 DIAGNOSTIC COLONOSCOPY: CPT

## 2021-06-23 PROCEDURE — 88342 IMHCHEM/IMCYTCHM 1ST ANTB: CPT

## 2021-06-23 PROCEDURE — 88342 IMHCHEM/IMCYTCHM 1ST ANTB: CPT | Mod: 26

## 2021-06-23 PROCEDURE — 82962 GLUCOSE BLOOD TEST: CPT

## 2021-06-23 PROCEDURE — 88305 TISSUE EXAM BY PATHOLOGIST: CPT | Mod: 26

## 2021-06-23 NOTE — HISTORY OF PRESENT ILLNESS
[FreeTextEntry1] : 51 yo WM c HTN, DM2, HLD, Recent covid in 2/2021; Has low volume hematochezia and Hx of anemia, s/p venofer infusions by Hematology.  Most recent normal.  Also with Epigastric and ruq abd pain.  Neg recent Sono, RUQ. \par No new medical issues.

## 2021-06-23 NOTE — PHYSICAL EXAM
[General Appearance - Alert] : alert [General Appearance - In No Acute Distress] : in no acute distress [Sclera] : the sclera and conjunctiva were normal [PERRL With Normal Accommodation] : pupils were equal in size, round, and reactive to light [Outer Ear] : the ears and nose were normal in appearance [Extraocular Movements] : extraocular movements were intact [Oropharynx] : the oropharynx was normal [Neck Appearance] : the appearance of the neck was normal [Neck Cervical Mass (___cm)] : no neck mass was observed [Jugular Venous Distention Increased] : there was no jugular-venous distention [Thyroid Diffuse Enlargement] : the thyroid was not enlarged [Thyroid Nodule] : there were no palpable thyroid nodules [Auscultation Breath Sounds / Voice Sounds] : lungs were clear to auscultation bilaterally [Heart Rate And Rhythm] : heart rate was normal and rhythm regular [Heart Sounds] : normal S1 and S2 [Heart Sounds Gallop] : no gallops [Murmurs] : no murmurs [Heart Sounds Pericardial Friction Rub] : no pericardial rub [Bowel Sounds] : normal bowel sounds [Abdomen Soft] : soft [Abdomen Tenderness] : non-tender [Abdomen Mass (___ Cm)] : no abdominal mass palpated [Normal Sphincter Tone] : normal sphincter tone [No Rectal Mass] : no rectal mass [Internal Hemorrhoid] : no internal hemorrhoids [External Hemorrhoid] : no external hemorrhoids [Occult Blood Positive] : stool was negative for occult blood [FreeTextEntry1] : Empty rectal vault.  No stool.  No blood.  No lesions. [No CVA Tenderness] : no ~M costovertebral angle tenderness [No Spinal Tenderness] : no spinal tenderness [Abnormal Walk] : normal gait [Nail Clubbing] : no clubbing  or cyanosis of the fingernails [Musculoskeletal - Swelling] : no joint swelling seen [Motor Tone] : muscle strength and tone were normal [Skin Color & Pigmentation] : normal skin color and pigmentation [Skin Turgor] : normal skin turgor [] : no rash

## 2021-06-25 LAB — SURGICAL PATHOLOGY STUDY: SIGNIFICANT CHANGE UP

## 2021-07-02 ENCOUNTER — NON-APPOINTMENT (OUTPATIENT)
Age: 51
End: 2021-07-02

## 2022-03-20 ENCOUNTER — FORM ENCOUNTER (OUTPATIENT)
Age: 52
End: 2022-03-20

## 2022-03-22 ENCOUNTER — NON-APPOINTMENT (OUTPATIENT)
Age: 52
End: 2022-03-22

## 2022-04-02 ENCOUNTER — OUTPATIENT (OUTPATIENT)
Dept: OUTPATIENT SERVICES | Facility: HOSPITAL | Age: 52
LOS: 1 days | End: 2022-04-02
Payer: COMMERCIAL

## 2022-04-02 ENCOUNTER — APPOINTMENT (OUTPATIENT)
Age: 52
End: 2022-04-02
Payer: COMMERCIAL

## 2022-04-02 DIAGNOSIS — G47.33 OBSTRUCTIVE SLEEP APNEA (ADULT) (PEDIATRIC): ICD-10-CM

## 2022-04-02 DIAGNOSIS — Z98.89 OTHER SPECIFIED POSTPROCEDURAL STATES: Chronic | ICD-10-CM

## 2022-04-02 PROCEDURE — 95810 POLYSOM 6/> YRS 4/> PARAM: CPT | Mod: 26

## 2022-04-02 PROCEDURE — 95810 POLYSOM 6/> YRS 4/> PARAM: CPT

## 2022-04-14 ENCOUNTER — NON-APPOINTMENT (OUTPATIENT)
Age: 52
End: 2022-04-14

## 2022-04-18 ENCOUNTER — APPOINTMENT (OUTPATIENT)
Dept: PULMONOLOGY | Facility: CLINIC | Age: 52
End: 2022-04-18
Payer: COMMERCIAL

## 2022-04-18 ENCOUNTER — NON-APPOINTMENT (OUTPATIENT)
Age: 52
End: 2022-04-18

## 2022-04-18 VITALS
SYSTOLIC BLOOD PRESSURE: 138 MMHG | BODY MASS INDEX: 29.37 KG/M2 | OXYGEN SATURATION: 97 % | HEART RATE: 90 BPM | DIASTOLIC BLOOD PRESSURE: 84 MMHG | WEIGHT: 172 LBS | HEIGHT: 64 IN | RESPIRATION RATE: 16 BRPM

## 2022-04-18 PROCEDURE — 99204 OFFICE O/P NEW MOD 45 MIN: CPT

## 2022-04-18 NOTE — DISCUSSION/SUMMARY
[Obstructive Sleep Apnea] : obstructive sleep apnea [Severe] : severe [Alcohol Avoidance] : alcohol avoidance [Sedative Avoidance] : sedative avoidance [Weight Loss Program] : weight loss program [de-identified] : Resmed Airsense  autoset  in a range 4-16 with nasal mask

## 2022-04-18 NOTE — HISTORY OF PRESENT ILLNESS
[Obstructive Sleep Apnea] : obstructive sleep apnea [Awakes Unrefreshed] : awakes unrefreshed [Awakes with Dry Mouth] : awakes with dry mouth [Daytime Somnolence] : daytime somnolence [Snoring] : snoring [Witnessed Apneas] : witnessed apneas [Lab] : lab [TextBox_77] : 2944 [TextBox_79] : 1679 [TextBox_81] : 60 [TextBox_89] : 2 [TextBox_100] : 4/2/22 [TextBox_108] : 52.2 [TextBox_116] : 86

## 2022-04-18 NOTE — CONSULT LETTER
[Dear  ___] : Dear  [unfilled], [Consult Letter:] : I had the pleasure of evaluating your patient, [unfilled]. [Please see my note below.] : Please see my note below. [Consult Closing:] : Thank you very much for allowing me to participate in the care of this patient.  If you have any questions, please do not hesitate to contact me. [Sincerely,] : Sincerely, [FreeTextEntry3] : Mati Lincoln MD FCCP\par Pulmonary/Critical Care/Sleep Medicine\par Department of Internal Medicine\par \par [facility]\par

## 2022-05-07 ENCOUNTER — OUTPATIENT (OUTPATIENT)
Dept: OUTPATIENT SERVICES | Facility: HOSPITAL | Age: 52
LOS: 1 days | End: 2022-05-07
Payer: COMMERCIAL

## 2022-05-07 ENCOUNTER — APPOINTMENT (OUTPATIENT)
Age: 52
End: 2022-05-07
Payer: COMMERCIAL

## 2022-05-07 DIAGNOSIS — Z98.89 OTHER SPECIFIED POSTPROCEDURAL STATES: Chronic | ICD-10-CM

## 2022-05-07 DIAGNOSIS — G47.33 OBSTRUCTIVE SLEEP APNEA (ADULT) (PEDIATRIC): ICD-10-CM

## 2022-05-07 PROCEDURE — 95811 POLYSOM 6/>YRS CPAP 4/> PARM: CPT

## 2022-05-07 PROCEDURE — 95811 POLYSOM 6/>YRS CPAP 4/> PARM: CPT | Mod: 26

## 2022-07-21 ENCOUNTER — APPOINTMENT (OUTPATIENT)
Dept: PULMONOLOGY | Facility: CLINIC | Age: 52
End: 2022-07-21

## 2022-07-26 ENCOUNTER — APPOINTMENT (OUTPATIENT)
Dept: GASTROENTEROLOGY | Facility: CLINIC | Age: 52
End: 2022-07-26

## 2022-07-26 VITALS
HEIGHT: 64 IN | RESPIRATION RATE: 16 BRPM | DIASTOLIC BLOOD PRESSURE: 80 MMHG | HEART RATE: 80 BPM | WEIGHT: 172 LBS | SYSTOLIC BLOOD PRESSURE: 130 MMHG | OXYGEN SATURATION: 98 % | BODY MASS INDEX: 29.37 KG/M2

## 2022-07-26 DIAGNOSIS — D62 ACUTE POSTHEMORRHAGIC ANEMIA: ICD-10-CM

## 2022-07-26 DIAGNOSIS — K92.1 MELENA: ICD-10-CM

## 2022-07-26 PROCEDURE — 99213 OFFICE O/P EST LOW 20 MIN: CPT

## 2022-07-26 PROCEDURE — 82272 OCCULT BLD FECES 1-3 TESTS: CPT

## 2022-07-26 NOTE — ASSESSMENT
[FreeTextEntry1] : Resolved right upper quadrant abdominal pain.  Prior ultrasound had failed to show any gallstone disease.  Only fatty liver.  Patient needs weight reduction and aerobic exercise.  Patient needs 10% reduction of weight over 1 year.\par \par Prolapsing internal hemorrhoids by classic history.  Current physical exam is normal.  No detectable external lesions or lesions within the anal canal.  Advised high-fiber diet and adequate hydration.  Advised topical use of Procto-Med HC 2.5% cream with rectal applicator twice daily for 1 week.\par If no response then colorectal surgical evaluation to consider anoscopy plus minus banding.\par GI office follow-up in 3 to 4 months.\par \par Given personal history of colon polyp on colonoscopy 8 years ago a surveillance colonoscopy in 5 years would be appropriate.

## 2022-07-26 NOTE — HISTORY OF PRESENT ILLNESS
[FreeTextEntry1] : 51-year-old  male with history of diabetes hypertension hyperlipidemia who had been referred for evaluation of anemia last year.  Patient gave a personal history of colon polyp removed via colonoscopy 8 years ago.  Endoscopy and colonoscopy exams were performed on 9/23/2021.  Endoscopy was completely normal.  Biopsies of the stomach were negative for chronic active gastritis or H. pylori.  Colonoscopy showed diverticulosis and internal hemorrhoids.  Sonogram had been performed and showed a fatty liver.  No gallstones or biliary dilatation.  No space-occupying lesion in liver.  A capsule endoscopy was considered but never performed.\par Patient is been followed by hematology Dr. Yanez who found a hemoglobin low at 11.8 on or about 1/22.  He has since received 2 infusions of Venofer or equivalent and been maintained on iron therapy.  Hemoglobin has improved to 13.\[facility] Patient had reported 1 bout of right upper quadrant abdominal pain for about 2 to 3-day duration.  This did not come to medical attention and resolved spontaneously.  No recurrence of abdominal pain.  Normal appetite.  No weight loss.  No jaundice or fever.  No history of pancreatitis or substance abuse.\[facility] Patient also complaining about prolapsing inflamed internal hemorrhoids that will occasionally cause low-volume hematochezia.  Denies diarrhea or constipation or anal/rectal trauma.  No external lesions.  No dermatitis.\[facility] Colonoscopy had shown moderate internal hemorrhoids.

## 2022-07-26 NOTE — PHYSICAL EXAM
[General Appearance - Alert] : alert [General Appearance - In No Acute Distress] : in no acute distress [Sclera] : the sclera and conjunctiva were normal [PERRL With Normal Accommodation] : pupils were equal in size, round, and reactive to light [Extraocular Movements] : extraocular movements were intact [Outer Ear] : the ears and nose were normal in appearance [Oropharynx] : the oropharynx was normal [Neck Appearance] : the appearance of the neck was normal [Neck Cervical Mass (___cm)] : no neck mass was observed [Jugular Venous Distention Increased] : there was no jugular-venous distention [Thyroid Diffuse Enlargement] : the thyroid was not enlarged [Thyroid Nodule] : there were no palpable thyroid nodules [Auscultation Breath Sounds / Voice Sounds] : lungs were clear to auscultation bilaterally [Heart Rate And Rhythm] : heart rate was normal and rhythm regular [Heart Sounds] : normal S1 and S2 [Heart Sounds Gallop] : no gallops [Murmurs] : no murmurs [Heart Sounds Pericardial Friction Rub] : no pericardial rub [Bowel Sounds] : normal bowel sounds [Abdomen Soft] : soft [Abdomen Tenderness] : non-tender [Abdomen Mass (___ Cm)] : no abdominal mass palpated [Normal Sphincter Tone] : normal sphincter tone [No Rectal Mass] : no rectal mass [Internal Hemorrhoid] : no internal hemorrhoids [External Hemorrhoid] : no external hemorrhoids [Occult Blood Positive] : stool was negative for occult blood [FreeTextEntry1] : Empty rectal vault.  No stool.  No blood.  No palpable lesions.  No external lesions.  Normal prostate. [Penis Abnormality] : the penis was normal [Scrotum] : the scrotum was normal [Testes Swelling] : the testicles were not swollen [Testes Mass (___cm)] : there were no testicular masses [Cervical Lymph Nodes Enlarged Posterior Bilaterally] : posterior cervical [Cervical Lymph Nodes Enlarged Anterior Bilaterally] : anterior cervical [Supraclavicular Lymph Nodes Enlarged Bilaterally] : supraclavicular [Axillary Lymph Nodes Enlarged Bilaterally] : axillary [Femoral Lymph Nodes Enlarged Bilaterally] : femoral [Inguinal Lymph Nodes Enlarged Bilaterally] : inguinal [No CVA Tenderness] : no ~M costovertebral angle tenderness [No Spinal Tenderness] : no spinal tenderness [Abnormal Walk] : normal gait [Nail Clubbing] : no clubbing  or cyanosis of the fingernails [Musculoskeletal - Swelling] : no joint swelling seen [Motor Tone] : muscle strength and tone were normal [Skin Color & Pigmentation] : normal skin color and pigmentation [Skin Turgor] : normal skin turgor [] : no rash

## 2022-08-17 ENCOUNTER — APPOINTMENT (OUTPATIENT)
Dept: COLORECTAL SURGERY | Facility: CLINIC | Age: 52
End: 2022-08-17

## 2022-08-17 VITALS
SYSTOLIC BLOOD PRESSURE: 139 MMHG | HEART RATE: 84 BPM | DIASTOLIC BLOOD PRESSURE: 99 MMHG | OXYGEN SATURATION: 98 % | RESPIRATION RATE: 14 BRPM | HEIGHT: 64 IN | WEIGHT: 170 LBS | BODY MASS INDEX: 29.02 KG/M2 | TEMPERATURE: 97.5 F

## 2022-08-17 VITALS
HEIGHT: 64 IN | RESPIRATION RATE: 14 BRPM | SYSTOLIC BLOOD PRESSURE: 169 MMHG | BODY MASS INDEX: 28.34 KG/M2 | WEIGHT: 166 LBS | DIASTOLIC BLOOD PRESSURE: 99 MMHG | OXYGEN SATURATION: 98 % | HEART RATE: 84 BPM | TEMPERATURE: 97.5 F

## 2022-08-17 PROCEDURE — 99203 OFFICE O/P NEW LOW 30 MIN: CPT | Mod: 25

## 2022-08-17 PROCEDURE — 46221 LIGATION OF HEMORRHOID(S): CPT

## 2022-08-17 NOTE — HISTORY OF PRESENT ILLNESS
[FreeTextEntry1] : 51-year-old male who presents for consultation for rectal bleeding.  He reports longstanding history of intermittent rectal bleeding with bowel movements.  His symptoms are worse with spicy foods.  He sometimes has discomfort in the area but no overt pain.  He has also noticed prolapsing rectal tissue that reduces spontaneously after bowel movements.  He has a history of hemorrhoids and has undergone banding in the past with improvement in his symptoms.  He also has a history of an anal procedure in 2011 but is unclear what the procedure or reason for it was.  He had a colonoscopy in June 2021 that showed diverticulosis and internal hemorrhoid.\par \par

## 2022-08-17 NOTE — CONSULT LETTER
[Dear  ___] : Dear  [unfilled], [Please see my note below.] : Please see my note below. [Consult Closing:] : Thank you very much for allowing me to participate in the care of this patient.  If you have any questions, please do not hesitate to contact me. [Sincerely,] : Sincerely, [FreeTextEntry3] : Johan Barton MD\par

## 2022-08-17 NOTE — PHYSICAL EXAM
[Excoriation] : no perianal excoriation [Normal] : was normal [None] : there was no rectal mass  [Gross Blood] : no gross blood [Respiratory Effort] : normal respiratory effort [Normal Rate and Rhythm] : normal rate and rhythm [Calm] : calm [de-identified] : Soft, nontender, nondistended.  No mass or hernias appreciated. [de-identified] : Inflamed grade 2 internal hemorrhoids [de-identified] : Well-appearing, in no distress [de-identified] : Normocephalic, atraumatic [de-identified] : Moves extremities without difficulty [de-identified] : Warm and dry [de-identified] : Alert and oriented x 3

## 2022-08-18 ENCOUNTER — NON-APPOINTMENT (OUTPATIENT)
Age: 52
End: 2022-08-18

## 2022-09-16 ENCOUNTER — APPOINTMENT (OUTPATIENT)
Dept: COLORECTAL SURGERY | Facility: CLINIC | Age: 52
End: 2022-09-16

## 2022-09-16 VITALS
SYSTOLIC BLOOD PRESSURE: 125 MMHG | HEART RATE: 110 BPM | WEIGHT: 170.38 LBS | BODY MASS INDEX: 29.09 KG/M2 | DIASTOLIC BLOOD PRESSURE: 85 MMHG | HEIGHT: 64 IN | OXYGEN SATURATION: 98 %

## 2022-09-16 PROCEDURE — 46221 LIGATION OF HEMORRHOID(S): CPT

## 2022-09-16 PROCEDURE — 99213 OFFICE O/P EST LOW 20 MIN: CPT | Mod: 25

## 2022-09-16 NOTE — HISTORY OF PRESENT ILLNESS
[FreeTextEntry1] : 51-year-old male who presents for a follow-up visit for rectal bleeding from internal hemorrhoids.  He underwent banding at his last office visit which helped his symptoms.  He is still having mild amount of bleeding with bowel movements.  He describes a sensation of incomplete defecation and tends to strain with his bowel movements.  He uses stool softeners regularly with bowel movements.  He had a colonoscopy in June 2021 that showed diverticulosis and internal hemorrhoid.\par \par

## 2022-09-16 NOTE — PROCEDURE
[FreeTextEntry1] : Risks and benefits of hemorrhoid banding was reviewed.  A posterior hemorrhoid was banded above the dentate line.  He tolerated the procedure well.

## 2022-09-16 NOTE — PHYSICAL EXAM
[Excoriation] : no perianal excoriation [Normal] : was normal [None] : there was no rectal mass  [Gross Blood] : no gross blood [Respiratory Effort] : normal respiratory effort [Calm] : calm [de-identified] : Prolapsing grade 2 internal hemorrhoids [de-identified] : Well-appearing, in no distress [de-identified] : Normocephalic, atraumatic [de-identified] : Moves extremities without difficulty [de-identified] : Warm and dry [de-identified] : Alert and oriented x 3

## 2022-09-30 ENCOUNTER — APPOINTMENT (OUTPATIENT)
Dept: COLORECTAL SURGERY | Facility: CLINIC | Age: 52
End: 2022-09-30

## 2022-09-30 VITALS
HEIGHT: 64 IN | DIASTOLIC BLOOD PRESSURE: 89 MMHG | SYSTOLIC BLOOD PRESSURE: 138 MMHG | BODY MASS INDEX: 29.02 KG/M2 | WEIGHT: 170 LBS | HEART RATE: 99 BPM

## 2022-09-30 PROCEDURE — 99212 OFFICE O/P EST SF 10 MIN: CPT | Mod: 25

## 2022-09-30 PROCEDURE — 46600 DIAGNOSTIC ANOSCOPY SPX: CPT

## 2022-09-30 NOTE — PHYSICAL EXAM
[Excoriation] : no perianal excoriation [Normal] : was normal [None] : there was no rectal mass  [Gross Blood] : no gross blood [Respiratory Effort] : normal respiratory effort [Calm] : calm [de-identified] : grade 2 internal hemorrhoids [de-identified] : Well-appearing, in no distress [de-identified] : Normocephalic, atraumatic [de-identified] : Moves extremities without difficulty [de-identified] : Warm and dry [de-identified] : Alert and oriented x 3

## 2022-09-30 NOTE — PROCEDURE
[FreeTextEntry1] : Anoscopy with grade 2 internal hemorrhoids.  Ulceration from prior banding noted above the dentate line.

## 2022-09-30 NOTE — HISTORY OF PRESENT ILLNESS
[FreeTextEntry1] : 51-year-old male who presents for a follow-up visit for rectal bleeding from internal hemorrhoids.  He has undergone hemorrhoid banding which has helped his bleeding.  He is no longer having rectal bleeding but has been having pain since his last office visit.  He feels to something prolapsing in and out of the rectum.  No fevers or chills.  He has regular bowel movements without difficulty.\par \par Last colonoscopy in June 2021 that showed diverticulosis and internal hemorrhoid.\par \par

## 2022-12-16 ENCOUNTER — APPOINTMENT (OUTPATIENT)
Dept: COLORECTAL SURGERY | Facility: CLINIC | Age: 52
End: 2022-12-16

## 2022-12-23 ENCOUNTER — EMERGENCY (EMERGENCY)
Facility: HOSPITAL | Age: 52
LOS: 1 days | Discharge: HOME | End: 2022-12-23
Attending: EMERGENCY MEDICINE
Payer: COMMERCIAL

## 2022-12-23 VITALS
RESPIRATION RATE: 16 BRPM | TEMPERATURE: 98 F | DIASTOLIC BLOOD PRESSURE: 92 MMHG | HEART RATE: 97 BPM | SYSTOLIC BLOOD PRESSURE: 147 MMHG | OXYGEN SATURATION: 96 %

## 2022-12-23 DIAGNOSIS — Z98.89 OTHER SPECIFIED POSTPROCEDURAL STATES: Chronic | ICD-10-CM

## 2022-12-23 PROCEDURE — 71046 X-RAY EXAM CHEST 2 VIEWS: CPT | Mod: 26

## 2022-12-23 PROCEDURE — 71046 X-RAY EXAM CHEST 2 VIEWS: CPT

## 2022-12-23 PROCEDURE — 99283 EMERGENCY DEPT VISIT LOW MDM: CPT

## 2022-12-23 PROCEDURE — 96372 THER/PROPH/DIAG INJ SC/IM: CPT

## 2022-12-23 PROCEDURE — 99284 EMERGENCY DEPT VISIT MOD MDM: CPT

## 2022-12-23 RX ORDER — KETOROLAC TROMETHAMINE 30 MG/ML
30 SYRINGE (ML) INJECTION ONCE
Refills: 0 | Status: DISCONTINUED | OUTPATIENT
Start: 2022-12-23 | End: 2022-12-23

## 2022-12-23 RX ORDER — BECLOMETHASONE DIPROPIONATE 40 UG/1
1 AEROSOL, METERED RESPIRATORY (INHALATION)
Qty: 1 | Refills: 0
Start: 2022-12-23 | End: 2022-12-29

## 2022-12-23 RX ADMIN — Medication 30 MILLIGRAM(S): at 21:53

## 2022-12-23 RX ADMIN — Medication 200 MILLIGRAM(S): at 21:52

## 2022-12-23 NOTE — ED ADULT NURSE NOTE - OBJECTIVE STATEMENT
pt to ED with flu like symptoms. pt reports body aches, cough, chest congestion. pt states he tested positive for the flu on tuesday and was started on tamiflu. pt reports body aches and pain when he coughs 2/2 phlegm production.

## 2022-12-23 NOTE — ED PROVIDER NOTE - CLINICAL SUMMARY MEDICAL DECISION MAKING FREE TEXT BOX
52 years old male history of hypertension ,hyperlipidemia, diabetes type 2 ,on medications presented emergency room complaining of the cough chest congestion since Monday (5 day). states he had a follow-up with his primary care doctor, testing for the flu was positive start him on Tamiflu is taking it since Tuesday.  he takes Tylenol ,  Tessalon as well as Tamiflu.  guaifenesin- toradol - and cXR - pt dose not want the swab now states he test himself since monday x 3 times

## 2022-12-23 NOTE — ED PROVIDER NOTE - NS ED ATTENDING STATEMENT MOD
This was a shared visit with the TR[facility]. I reviewed and verified the documentation and independently performed the documented:

## 2022-12-23 NOTE — ED PROVIDER NOTE - PHYSICAL EXAMINATION
Const: AOX3 nontoxic appearing, no apparent respiratory or physical distress. Stable gait   HEENT: NC/AT. Moist mucous membranes. throat clear uvula midline   Eyes: RORO. EOMI  Neck: Soft and supple. Full ROM without pain.  Cardiac: Regular rate and regular rhythm. +S1/S2. No murmurs. Peripheral pulses 2+ and symmetric. No LE edema.  Resp: Speaking in full sentences. No evidence of respiratory distress. No wheezes, rales or rhonchi. No adventitious breath sounds   Abd: Soft, non-tender, non-distended.  Lymph: No cervical lymphadenopathy.  Neuro: Awake, alert & oriented x 3. Moves all extremities symmetrically.

## 2022-12-23 NOTE — ED PROVIDER NOTE - PATIENT PORTAL LINK FT
You can access the FollowMyHealth Patient Portal offered by [facility] by registering at the following website: [email or web address]. By joining [email or web address]’s FollowMyHealth portal, you will also be able to view your health information using other applications (apps) compatible with our system.

## 2022-12-23 NOTE — ED ADULT TRIAGE NOTE - CHIEF COMPLAINT QUOTE
Pt. complaining of cough, fever, headache. pt. given antibiotics by his doctor yesterday. pt. last took Tylenol at 6pm.

## 2022-12-23 NOTE — ED PROVIDER NOTE - OBJECTIVE STATEMENT
52 years old male history of hypertension ,hyperlipidemia, diabetes type 2 ,on medications presented emergency room complaining of the cough chest congestion since Monday (5 day). states he had a follow-up with his primary care doctor, testing for the flu was positive start him on Tamiflu is taking it since Tuesday.  he takes Tylenol ,  Tessalon as well as Tamiflu.  States his chest is hurting when he has coughing with some phlegm production.  Denies any coughing blood or black or swollen .  He is taking baby aspirin daily. he took home test for covid x 3 negative at home . he admit the fever at home . but he did not take his temp

## 2022-12-23 NOTE — ED PROVIDER NOTE - NSFOLLOWUPINSTRUCTIONS_ED_ALL_ED_FT
Cough    Coughing is a reflex that clears your throat and your airways. Coughing helps to heal and protect your lungs. It is normal to cough occasionally, but a cough that happens with other symptoms or lasts a long time may be a sign of a condition that needs treatment. Coughing may be caused by infections, asthma or COPD, smoking, postnasal drip, gastroesophageal reflux, as well as other medical conditions. Take medicines only as instructed by your health care provider. Avoid environments or triggers that causes you to cough at work or at home.    SEEK IMMEDIATE MEDICAL CARE IF YOU HAVE ANY OF THE FOLLOWING SYMPTOMS: coughing up blood, shortness of breath, rapid heart rate, chest pain, unexplained weight loss or night sweats. please take guaifenesin instead of tessalon follow direction   use the inhaler as prescribed   continue Tamiflu     call and follow up with primary care within 2-3 days   Cough    Coughing is a reflex that clears your throat and your airways. Coughing helps to heal and protect your lungs. It is normal to cough occasionally, but a cough that happens with other symptoms or lasts a long time may be a sign of a condition that needs treatment. Coughing may be caused by infections, asthma or COPD, smoking, postnasal drip, gastroesophageal reflux, as well as other medical conditions. Take medicines only as instructed by your health care provider. Avoid environments or triggers that causes you to cough at work or at home.    SEEK IMMEDIATE MEDICAL CARE IF YOU HAVE ANY OF THE FOLLOWING SYMPTOMS: coughing up blood, shortness of breath, rapid heart rate, chest pain, unexplained weight loss or night sweats.

## 2023-01-06 ENCOUNTER — APPOINTMENT (OUTPATIENT)
Dept: COLORECTAL SURGERY | Facility: CLINIC | Age: 53
End: 2023-01-06
Payer: COMMERCIAL

## 2023-01-06 PROCEDURE — 46221 LIGATION OF HEMORRHOID(S): CPT

## 2023-01-06 PROCEDURE — 99213 OFFICE O/P EST LOW 20 MIN: CPT | Mod: 25

## 2023-01-06 NOTE — PHYSICAL EXAM
[Excoriation] : no perianal excoriation [Normal] : was normal [None] : there was no rectal mass  [Gross Blood] : no gross blood [Respiratory Effort] : normal respiratory effort [Calm] : calm [de-identified] : Prolapsing internal hemorrhoids [de-identified] : Well-appearing, in no distress [de-identified] : Normocephalic, atraumatic [de-identified] : Moves extremities without difficulty [de-identified] : Warm and dry [de-identified] : Alert and oriented x 3

## 2023-01-06 NOTE — HISTORY OF PRESENT ILLNESS
[FreeTextEntry1] : 52-year-old male who presents for a follow-up visit for rectal bleeding from internal hemorrhoids.  Banding has helped his symptoms of bleeding but he continues to have prolapsing rectal tissue particularly with bowel movements which requires manual reduction. Denies rectal pain. He has regular bowel movements without difficulty.\par \par Last colonoscopy in June 2021 that showed diverticulosis and internal hemorrhoid.\par

## 2023-01-06 NOTE — PROCEDURE
[FreeTextEntry1] : Risks and benefit of hemorrhoid banding was reviewed. Left hemorrhoid was banded above the dentate line and he tolerated the procedure well.

## 2023-01-17 NOTE — ED PROVIDER NOTE - PMH
Diabetes    HTN (hypertension)    Hyperlipidemia    
Quality 431: Preventive Care And Screening: Unhealthy Alcohol Use - Screening: Patient not identified as an unhealthy alcohol user when screened for unhealthy alcohol use using a systematic screening method
Quality 110: Preventive Care And Screening: Influenza Immunization: Influenza Immunization not Administered for Documented Reasons.
Detail Level: Detailed
Quality 226: Preventive Care And Screening: Tobacco Use: Screening And Cessation Intervention: Patient screened for tobacco use and is an ex/non-smoker
Quality 130: Documentation Of Current Medications In The Medical Record: Current Medications Documented

## 2023-02-03 ENCOUNTER — APPOINTMENT (OUTPATIENT)
Dept: COLORECTAL SURGERY | Facility: CLINIC | Age: 53
End: 2023-02-03
Payer: COMMERCIAL

## 2023-02-03 VITALS
DIASTOLIC BLOOD PRESSURE: 88 MMHG | HEIGHT: 64 IN | HEART RATE: 93 BPM | SYSTOLIC BLOOD PRESSURE: 130 MMHG | WEIGHT: 170 LBS | BODY MASS INDEX: 29.02 KG/M2

## 2023-02-03 PROCEDURE — 46221 LIGATION OF HEMORRHOID(S): CPT

## 2023-02-03 PROCEDURE — 99213 OFFICE O/P EST LOW 20 MIN: CPT | Mod: 25

## 2023-02-04 NOTE — PHYSICAL EXAM
[Excoriation] : no perianal excoriation [Normal] : was normal [None] : there was no rectal mass  [Gross Blood] : no gross blood [Respiratory Effort] : normal respiratory effort [Calm] : calm [de-identified] : Well-appearing, in no distress [de-identified] : grade 2 internal hemorrhoids [de-identified] : Normocephalic, atraumatic [de-identified] : Warm and dry [de-identified] : Moves extremities without difficulty [de-identified] : Alert and oriented x 3

## 2023-02-04 NOTE — HISTORY OF PRESENT ILLNESS
[FreeTextEntry1] : 52-year-old male who presents for a follow-up visit for rectal bleeding from internal hemorrhoids.  He reports significant improvement in his hemorrhoids and bleeding with banding. He noticed some bright red blood with bowel movements in the past week. He otherwise has regular bowel movements without difficulty.\par \par Last colonoscopy in June 2021 that showed diverticulosis and internal hemorrhoid.\par

## 2023-02-04 NOTE — PROCEDURE
[FreeTextEntry1] : Risks and benefit of hemorrhoid banding was reviewed. Anterior hemorrhoid was banded above the dentate line and he tolerated the procedure well.

## 2023-03-07 ENCOUNTER — APPOINTMENT (OUTPATIENT)
Dept: ORTHOPEDIC SURGERY | Facility: CLINIC | Age: 53
End: 2023-03-07
Payer: COMMERCIAL

## 2023-03-07 VITALS
WEIGHT: 165 LBS | TEMPERATURE: 97.1 F | HEIGHT: 64 IN | HEART RATE: 85 BPM | SYSTOLIC BLOOD PRESSURE: 124 MMHG | DIASTOLIC BLOOD PRESSURE: 85 MMHG | BODY MASS INDEX: 28.17 KG/M2

## 2023-03-07 DIAGNOSIS — M25.531 PAIN IN RIGHT WRIST: ICD-10-CM

## 2023-03-07 PROCEDURE — 99204 OFFICE O/P NEW MOD 45 MIN: CPT | Mod: 25

## 2023-03-07 PROCEDURE — 73110 X-RAY EXAM OF WRIST: CPT | Mod: 50

## 2023-03-07 PROCEDURE — 20550 NJX 1 TENDON SHEATH/LIGAMENT: CPT | Mod: RT

## 2023-03-07 NOTE — HISTORY OF PRESENT ILLNESS
[FreeTextEntry1] : Robert is a pleasant 52-year-old male who presents today with a 1 year history of worsening right wrist and thumb pain.  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

## 2023-03-07 NOTE — ASSESSMENT
[FreeTextEntry1] : ASSESSMENT:\par \par The patient comes in today with chronic 1 year history of worsening exacerbated symptoms of right wrist tendinopathy as well as right thumb tendinopathy.  We discussed this condition as well as treatment modalities and he elected to proceed with injection therapy\par [I have diagnosed the patient today with a new diagnosis - This may diminish bodily function for the extremity.] \par \par [For this I was able to review other physician’s note(s) including reviewing other tests, imaging results as well as personally view these results for my own interpretation.]\par \par Injection:\par \par The risks and benefits of a steroid injection were discussed in detail. The risks include but are not limited to: pain, infection, swelling, flare response, bleeding, subcutaneous fat atrophy, skin depigmentation and/or elevation of blood sugar. The risk of incomplete resolution of symptoms, recurrence and additional intervention was reviewed and considered by the patient. \par The patient agreed to proceed and under a sterile prep, I injected 1 unit into 2 cc of a combination of Celestone and Lidocaine into the right wrist first dorsal compartment, right thumb A1 pulley. The patient tolerated the injection well.\par \par The patient was adequately and thoroughly informed of my assessment of their current condition(s). \par \par DISCUSSION:\par 1.  I am hopeful that the injection to the right wrist and right thumb will help relieve his tendinopathy\par 2.  I would like to see him again in 10 weeks time to reassess\par

## 2023-03-07 NOTE — CONSULT LETTER
[Dear  ___] : Dear  [unfilled], [Consult Letter:] : I had the pleasure of evaluating your patient, [unfilled]. [Please see my note below.] : Please see my note below. [Consult Closing:] : Thank you very much for allowing me to participate in the care of this patient.  If you have any questions, please do not hesitate to contact me. [Sincerely,] : Sincerely, [FreeTextEntry2] : Pauline Tuttle MD\par [address] [facility]\[place] [address] [FreeTextEntry3] : Bruce Worley MD

## 2023-03-07 NOTE — PHYSICAL EXAM
[de-identified] : He is well-appearing on exam\par \par Examination of the [right] wrist reveals tenderness at the level of the first dorsal compartment with a positive finkelstein's examination\par Examination of the [right] hand particularly at the A1 of the thumb reveals tenderness with a palpable click. \par \par  [de-identified] : [4] views of [bilateral hands and wrists] were obtained today in my office and were seen by me and discussed with the patient. \par These [show findings consistent with bilateral basal joint OA and findings of IP joint OA]\par

## 2023-03-28 ENCOUNTER — APPOINTMENT (OUTPATIENT)
Dept: GASTROENTEROLOGY | Facility: CLINIC | Age: 53
End: 2023-03-28
Payer: COMMERCIAL

## 2023-03-28 VITALS
WEIGHT: 165 LBS | DIASTOLIC BLOOD PRESSURE: 84 MMHG | SYSTOLIC BLOOD PRESSURE: 148 MMHG | HEART RATE: 91 BPM | HEIGHT: 64 IN | OXYGEN SATURATION: 98 % | RESPIRATION RATE: 16 BRPM | BODY MASS INDEX: 28.17 KG/M2 | TEMPERATURE: 98.6 F

## 2023-03-28 DIAGNOSIS — Z09 ENCOUNTER FOR FOLLOW-UP EXAMINATION AFTER COMPLETED TREATMENT FOR CONDITIONS OTHER THAN MALIGNANT NEOPLASM: ICD-10-CM

## 2023-03-28 PROCEDURE — 99214 OFFICE O/P EST MOD 30 MIN: CPT

## 2023-03-28 RX ORDER — LOSARTAN POTASSIUM 100 MG/1
100 TABLET, FILM COATED ORAL
Refills: 0 | Status: ACTIVE | COMMUNITY

## 2023-03-28 RX ORDER — OMEPRAZOLE 20 MG/1
20 CAPSULE, DELAYED RELEASE ORAL
Refills: 0 | Status: ACTIVE | COMMUNITY

## 2023-03-28 RX ORDER — EMPAGLIFLOZIN 25 MG/1
25 TABLET, FILM COATED ORAL
Refills: 0 | Status: ACTIVE | COMMUNITY

## 2023-03-28 RX ORDER — ATORVASTATIN CALCIUM 40 MG/1
40 TABLET, FILM COATED ORAL
Refills: 0 | Status: ACTIVE | COMMUNITY

## 2023-03-28 RX ORDER — EMPAGLIFLOZIN 10 MG/1
10 TABLET, FILM COATED ORAL
Refills: 0 | Status: DISCONTINUED | COMMUNITY
End: 2023-03-28

## 2023-03-28 RX ORDER — ASPIRIN 81 MG
81 TABLET, DELAYED RELEASE (ENTERIC COATED) ORAL
Refills: 0 | Status: ACTIVE | COMMUNITY

## 2023-03-28 RX ORDER — HYDROCORTISONE 25 MG/G
2.5 CREAM TOPICAL
Qty: 28 | Refills: 4 | Status: DISCONTINUED | COMMUNITY
Start: 2022-07-26 | End: 2023-03-28

## 2023-03-28 RX ORDER — METFORMIN HYDROCHLORIDE 1000 MG/1
1000 TABLET, COATED ORAL
Refills: 0 | Status: ACTIVE | COMMUNITY

## 2023-03-29 NOTE — REASON FOR VISIT
[Follow-up] : a follow-up of an existing diagnosis [FreeTextEntry1] : Internal hemorrhoids.  Right upper quadrant pain.  Fatty liver.

## 2023-03-29 NOTE — PHYSICAL EXAM
[Alert] : alert [Normal Voice/Communication] : normal voice/communication [Healthy Appearing] : healthy appearing [No Acute Distress] : no acute distress [Sclera] : the sclera and conjunctiva were normal [Hearing Threshold Finger Rub Not [place]] : hearing was normal [Normal Lips/Gums] : the lips and gums were normal [Oropharynx] : the oropharynx was normal [Normal Appearance] : the appearance of the neck was normal [No Neck Mass] : no neck mass was observed [No Acc Muscle Use] : no accessory muscle use [No Respiratory Distress] : no respiratory distress [Respiration, Rhythm And Depth] : normal respiratory rhythm and effort [Auscultation Breath Sounds / Voice Sounds] : lungs were clear to auscultation bilaterally [Heart Rate And Rhythm] : heart rate was normal and rhythm regular [Normal S1, S2] : normal S1 and S2 [Murmurs] : no murmurs [Bowel Sounds] : normal bowel sounds [Abdomen Tenderness] : non-tender [No Masses] : no abdominal mass palpated [Abdomen Soft] : soft [] : no hepatosplenomegaly [Oriented To Time, Place, And Person] : oriented to person, place, and time [de-identified] : Flat soft benign.  No organomegaly.  No mass tenderness or rebound. [de-identified] : Not done today. [de-identified] : Normal. [de-identified] : Normal. [de-identified] : Normal.

## 2023-03-29 NOTE — HISTORY OF PRESENT ILLNESS
[de-identified] : EGD normal 9/21.  Path negative. [FreeTextEntry1] : 2014 .  personal history of colon polyp dating 9 years ago \par 6/21 colonoscopy normal.  Internal hemorrhoids moderate noted.

## 2023-03-29 NOTE — ASSESSMENT
[FreeTextEntry1] : Fatty liver with normal LFTs.  Condition present for multiple years.  Intentional weight loss recently accomplished is beneficial.  Advised repeat sonogram to check on status of fatty liver.  Advised FibroScan of the liver.\par GI office follow-up here in 4 months.\par \par History of heartburn currently under control with low-dose omeprazole.  EGD recently normal.  9/21.\par \par Remote history of colon polyp.  Polyp surveillance colonoscopy was negative in 6/21 except for moderate internal hemorrhoids.  5 years polyp surveillance colonoscopy advised.  Next colonoscopy due 6/26.

## 2023-04-08 ENCOUNTER — APPOINTMENT (OUTPATIENT)
Dept: ULTRASOUND IMAGING | Facility: CLINIC | Age: 53
End: 2023-04-08
Payer: COMMERCIAL

## 2023-04-08 ENCOUNTER — OUTPATIENT (OUTPATIENT)
Dept: OUTPATIENT SERVICES | Facility: HOSPITAL | Age: 53
LOS: 1 days | End: 2023-04-08

## 2023-04-08 DIAGNOSIS — K76.0 FATTY (CHANGE OF) LIVER, NOT ELSEWHERE CLASSIFIED: ICD-10-CM

## 2023-04-08 DIAGNOSIS — Z98.89 OTHER SPECIFIED POSTPROCEDURAL STATES: Chronic | ICD-10-CM

## 2023-04-08 PROCEDURE — 76705 ECHO EXAM OF ABDOMEN: CPT | Mod: 26

## 2023-04-14 ENCOUNTER — APPOINTMENT (OUTPATIENT)
Dept: GASTROENTEROLOGY | Facility: CLINIC | Age: 53
End: 2023-04-14
Payer: COMMERCIAL

## 2023-04-14 PROCEDURE — 91200 LIVER ELASTOGRAPHY: CPT

## 2023-04-14 NOTE — ASSESSMENT
[FreeTextEntry1] : FibroScan/ Vibration Controlled Transient Elastography (VCTE) Report\par \par PROBE SIZE: M\par \par INDICATION: NAFLD/CARPENTER\par \par REFERRING PHYSICIAN: Dr. Tuan Brasher\par \par  \par PROCEDURE:\par \par Patient was NPO for 4 hours prior to procedure. After providing oral explanations of the procedure to the patient, patient was placed in supine position with right arm in maximum abduction to allow optimal exposure of right lateral abdomen. Patient abdomen was briefly assessed to identify to the terminus of the xyphoid process. The ideal target testing spot was located mid-line and lateral to this point. To acquire proper signals, the skin to liver capsule distance was accessed with the FibroScan probe. Patient was instructed to breathe normally and to abstain from sudden movements during the procedure. Ten shear waves were produced and measurements of the speed of each wave evaluated. Patient tolerated the procedure well and was discharged without incident.\par \par  \par FINDINGS:\par \par - Median shear wave speed of 1.45 meters/second.\par \par - This corresponds to a median Liver Stiffness Score of 6.3 kPa.\par \par - IQR/med 27%\par \par -  dB/m\par  \par RESULTS:\par \par Findings inconclusive. Recommend MR Elastography for further evaluation.\par \par The recommendation regarding fibrosis stage and/ or steatosis grade is based on current published scientific literature and the provided patient’s diagnosis. Any further medical or surgical intervention should be made by considering the overall medical condition of the patient.

## 2023-06-06 ENCOUNTER — APPOINTMENT (OUTPATIENT)
Dept: ORTHOPEDIC SURGERY | Facility: CLINIC | Age: 53
End: 2023-06-06
Payer: COMMERCIAL

## 2023-06-06 VITALS
HEART RATE: 79 BPM | DIASTOLIC BLOOD PRESSURE: 86 MMHG | BODY MASS INDEX: 28.17 KG/M2 | TEMPERATURE: 98.1 F | SYSTOLIC BLOOD PRESSURE: 135 MMHG | HEIGHT: 64 IN | WEIGHT: 165 LBS

## 2023-06-06 DIAGNOSIS — M65.30 TRIGGER FINGER, UNSPECIFIED FINGER: ICD-10-CM

## 2023-06-06 PROCEDURE — 20550 NJX 1 TENDON SHEATH/LIGAMENT: CPT | Mod: RT

## 2023-06-06 PROCEDURE — 99214 OFFICE O/P EST MOD 30 MIN: CPT | Mod: 25

## 2023-06-06 NOTE — HISTORY OF PRESENT ILLNESS
[FreeTextEntry1] : Robert returns for follow-up with known history of right de Quervain's as well as right thumb triggering.  Prior injections helped tremendously however symptoms persist.

## 2023-06-06 NOTE — PHYSICAL EXAM
[de-identified] : He is well-appearing on exam\par \par Examination of the [right] wrist reveals tenderness at the level of the first dorsal compartment with a positive finkelstein's examination\par Examination of the [right] hand particularly at the A1 of the thumb reveals tenderness with a palpable click. \par \par

## 2023-07-16 ENCOUNTER — EMERGENCY (EMERGENCY)
Facility: HOSPITAL | Age: 53
LOS: 1 days | Discharge: HOME | End: 2023-07-16
Attending: EMERGENCY MEDICINE
Payer: COMMERCIAL

## 2023-07-16 VITALS
SYSTOLIC BLOOD PRESSURE: 153 MMHG | OXYGEN SATURATION: 98 % | WEIGHT: 166.67 LBS | DIASTOLIC BLOOD PRESSURE: 84 MMHG | RESPIRATION RATE: 18 BRPM | TEMPERATURE: 98 F | HEART RATE: 112 BPM

## 2023-07-16 VITALS
SYSTOLIC BLOOD PRESSURE: 160 MMHG | TEMPERATURE: 99 F | DIASTOLIC BLOOD PRESSURE: 86 MMHG | HEART RATE: 98 BPM | OXYGEN SATURATION: 100 % | RESPIRATION RATE: 16 BRPM

## 2023-07-16 DIAGNOSIS — Z98.89 OTHER SPECIFIED POSTPROCEDURAL STATES: Chronic | ICD-10-CM

## 2023-07-16 PROCEDURE — 93005 ELECTROCARDIOGRAM TRACING: CPT

## 2023-07-16 PROCEDURE — 71046 X-RAY EXAM CHEST 2 VIEWS: CPT | Mod: 26

## 2023-07-16 PROCEDURE — 71046 X-RAY EXAM CHEST 2 VIEWS: CPT

## 2023-07-16 PROCEDURE — 99284 EMERGENCY DEPT VISIT MOD MDM: CPT | Mod: 25

## 2023-07-16 PROCEDURE — 93010 ELECTROCARDIOGRAM REPORT: CPT

## 2023-07-16 PROCEDURE — 82962 GLUCOSE BLOOD TEST: CPT

## 2023-07-16 PROCEDURE — 71250 CT THORAX DX C-: CPT | Mod: 26,MA

## 2023-07-16 PROCEDURE — 71250 CT THORAX DX C-: CPT | Mod: MA

## 2023-07-16 PROCEDURE — 99284 EMERGENCY DEPT VISIT MOD MDM: CPT

## 2023-07-16 RX ORDER — METHOCARBAMOL 500 MG/1
1500 TABLET, FILM COATED ORAL ONCE
Refills: 0 | Status: COMPLETED | OUTPATIENT
Start: 2023-07-16 | End: 2023-07-16

## 2023-07-16 RX ORDER — METHOCARBAMOL 500 MG/1
2 TABLET, FILM COATED ORAL
Qty: 30 | Refills: 0
Start: 2023-07-16 | End: 2023-07-20

## 2023-07-16 RX ORDER — IBUPROFEN 200 MG
600 TABLET ORAL ONCE
Refills: 0 | Status: COMPLETED | OUTPATIENT
Start: 2023-07-16 | End: 2023-07-16

## 2023-07-16 RX ORDER — LIDOCAINE 4 G/100G
1 CREAM TOPICAL ONCE
Refills: 0 | Status: COMPLETED | OUTPATIENT
Start: 2023-07-16 | End: 2023-07-16

## 2023-07-16 RX ORDER — IBUPROFEN 200 MG
1 TABLET ORAL
Qty: 15 | Refills: 0
Start: 2023-07-16 | End: 2023-07-20

## 2023-07-16 RX ADMIN — METHOCARBAMOL 1500 MILLIGRAM(S): 500 TABLET, FILM COATED ORAL at 11:08

## 2023-07-16 RX ADMIN — Medication 600 MILLIGRAM(S): at 11:09

## 2023-07-16 RX ADMIN — LIDOCAINE 1 PATCH: 4 CREAM TOPICAL at 11:09

## 2023-07-16 NOTE — ED ADULT NURSE NOTE - NSFALLUNIVINTERV_ED_ALL_ED
Bed/Stretcher in lowest position, wheels locked, appropriate side rails in place/Call bell, personal items and telephone in reach/Instruct patient to call for assistance before getting out of bed/chair/stretcher/Non-slip footwear applied when patient is off stretcher/[place] to call system/Physically safe environment - no spills, clutter or unnecessary equipment/Purposeful proactive rounding/Room/bathroom lighting operational, light cord in reach
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
## 2023-07-16 NOTE — ED PROVIDER NOTE - PHYSICAL EXAMINATION
Constitutional - well-developed; well nourished. Head - NCAT. Airway patent. Eyes - PERRL. CV - RRR. no murmur. no edema. Chest wall +mid sternum tenderness and R axiliary tenderness along lateral aspect of ribs.  Pulm - CTAB. Abd - soft, nt. no rebound. no guarding. Neuro - A&Ox3. strength 5/5 x4. sensation intact x4. normal gait. Skin - No rash. MSK - normal ROM.
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
## 2023-07-16 NOTE — ED PROVIDER NOTE - WET READ LAUNCH FT
INR 3.0 in clinic today. Pt denies bleeding or bruising. Pt reports she has had a cold for the last week, has increased her Tylenol intake to 4-6 tablets daily; per Micromedex can increase risk of bleeding. Pt also reports she has had a decrease in her appetite since having the cold. Discussed continuing same TWD of 16.25 mg taking 1.25 mg on Monday and 2.5 mg all other days of the week. Recheck INR in 4 weeks. See AAC flowsheet. Onsite billing provider is Dr. Camilo. Pt ambulated to clinic without assistive device. SJ  
There are no Wet Read(s) to document.
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
## 2023-09-01 ENCOUNTER — APPOINTMENT (OUTPATIENT)
Dept: COLORECTAL SURGERY | Facility: CLINIC | Age: 53
End: 2023-09-01
Payer: COMMERCIAL

## 2023-09-01 VITALS
BODY MASS INDEX: 28.17 KG/M2 | DIASTOLIC BLOOD PRESSURE: 79 MMHG | SYSTOLIC BLOOD PRESSURE: 160 MMHG | HEIGHT: 64 IN | WEIGHT: 165 LBS

## 2023-09-01 PROCEDURE — 99213 OFFICE O/P EST LOW 20 MIN: CPT | Mod: 25

## 2023-09-01 PROCEDURE — 46221 LIGATION OF HEMORRHOID(S): CPT

## 2023-09-01 NOTE — PROCEDURE
[FreeTextEntry1] : Risks and benefit of hemorrhoid banding was reviewed. Left hemorrhoid was banded x 2 above the dentate line and he tolerated the procedure well.

## 2023-09-01 NOTE — HISTORY OF PRESENT ILLNESS
[FreeTextEntry1] : 52-year-old male who presents for a follow-up visit for rectal bleeding from internal hemorrhoids.  He has undergone hemorrhoid banding in the past and improved significantly in terms of bleeding.  Over the last few weeks, he has noticed prolapsing rectal tissue with bowel movements.  Although his bowel movements are soft and regular, he feels the need to strain in order to defecate.    Last colonoscopy in June 2021 that showed diverticulosis and internal hemorrhoid.

## 2023-09-01 NOTE — PLAN
[TextEntry] : 52-year-old male with symptomatic hemorrhoids for which banding was performed as described above.  Postprocedure instructions provided.  Recommend high-fiber diet, increase water intake and avoidance of straining with bowel movements.  Follow-up in 1 month.

## 2023-09-01 NOTE — PHYSICAL EXAM
[Excoriation] : no perianal excoriation [Normal] : was normal [None] : there was no rectal mass  [Gross Blood] : no gross blood [Respiratory Effort] : normal respiratory effort [Calm] : calm [de-identified] : grade 2 internal hemorrhoids [de-identified] : Well-appearing, in no distress [de-identified] : Normocephalic, atraumatic [de-identified] : Moves extremities without difficulty [de-identified] : Warm and dry [de-identified] : Alert and oriented x 3

## 2023-09-26 ENCOUNTER — APPOINTMENT (OUTPATIENT)
Dept: GASTROENTEROLOGY | Facility: CLINIC | Age: 53
End: 2023-09-26
Payer: COMMERCIAL

## 2023-09-26 VITALS
DIASTOLIC BLOOD PRESSURE: 100 MMHG | BODY MASS INDEX: 27.66 KG/M2 | OXYGEN SATURATION: 98 % | HEART RATE: 97 BPM | SYSTOLIC BLOOD PRESSURE: 150 MMHG | HEIGHT: 64 IN | WEIGHT: 162 LBS

## 2023-09-26 DIAGNOSIS — K59.09 OTHER CONSTIPATION: ICD-10-CM

## 2023-09-26 PROCEDURE — 99213 OFFICE O/P EST LOW 20 MIN: CPT

## 2023-10-03 ENCOUNTER — APPOINTMENT (OUTPATIENT)
Dept: ORTHOPEDIC SURGERY | Facility: CLINIC | Age: 53
End: 2023-10-03

## 2023-10-06 ENCOUNTER — APPOINTMENT (OUTPATIENT)
Dept: COLORECTAL SURGERY | Facility: CLINIC | Age: 53
End: 2023-10-06
Payer: COMMERCIAL

## 2023-10-06 PROCEDURE — 99213 OFFICE O/P EST LOW 20 MIN: CPT | Mod: 25

## 2023-10-06 PROCEDURE — 46221 LIGATION OF HEMORRHOID(S): CPT

## 2024-02-09 NOTE — ASSESSMENT
From: Daija Cha  To: Dr. Diego Weems  Sent: 2/9/2024 9:56 AM EST  Subject: Refill     Xanix refilled please   [FreeTextEntry1] : ASSESSMENT:\par \par The patient comes in today with chronic greater than 1 year history of worsening exacerbated symptoms of right wrist tendinopathy as well as right thumb tendinopathy.  He is currently status post injections which did help however he would like a second round.\par [I have diagnosed the patient today with a new diagnosis - This may diminish bodily function for the extremity.] \par \par [For this I was able to review other physician’s note(s) including reviewing other tests, imaging results as well as personally view these results for my own interpretation.]\par \par Injection:\par \par The risks and benefits of a steroid injection were discussed in detail. The risks include but are not limited to: pain, infection, swelling, flare response, bleeding, subcutaneous fat atrophy, skin depigmentation and/or elevation of blood sugar. The risk of incomplete resolution of symptoms, recurrence and additional intervention was reviewed and considered by the patient. \par The patient agreed to proceed and under a sterile prep, I injected 1 unit into 2 cc of a combination of Celestone and Lidocaine into the right wrist first dorsal compartment, right thumb A1 pulley. The patient tolerated the injection well.\par \par The patient was adequately and thoroughly informed of my assessment of their current condition(s). \par \par DISCUSSION:\par 1.  I am hopeful that the injection to the right wrist and right thumb will help relieve his tendinopathy\par 2.  I would like to see him again in 10 weeks time to reassess\par

## 2024-04-01 ENCOUNTER — NON-APPOINTMENT (OUTPATIENT)
Age: 54
End: 2024-04-01

## 2024-04-16 ENCOUNTER — APPOINTMENT (OUTPATIENT)
Dept: GASTROENTEROLOGY | Facility: CLINIC | Age: 54
End: 2024-04-16
Payer: COMMERCIAL

## 2024-04-16 VITALS
SYSTOLIC BLOOD PRESSURE: 110 MMHG | WEIGHT: 160 LBS | HEIGHT: 64 IN | HEART RATE: 70 BPM | BODY MASS INDEX: 27.31 KG/M2 | RESPIRATION RATE: 16 BRPM | OXYGEN SATURATION: 98 % | DIASTOLIC BLOOD PRESSURE: 80 MMHG

## 2024-04-16 DIAGNOSIS — G47.33 OBSTRUCTIVE SLEEP APNEA (ADULT) (PEDIATRIC): ICD-10-CM

## 2024-04-16 DIAGNOSIS — K76.0 FATTY (CHANGE OF) LIVER, NOT ELSEWHERE CLASSIFIED: ICD-10-CM

## 2024-04-16 DIAGNOSIS — R10.13 EPIGASTRIC PAIN: ICD-10-CM

## 2024-04-16 DIAGNOSIS — R10.11 RIGHT UPPER QUADRANT PAIN: ICD-10-CM

## 2024-04-16 PROCEDURE — 99213 OFFICE O/P EST LOW 20 MIN: CPT

## 2024-04-16 RX ORDER — ORAL SEMAGLUTIDE 14 MG/1
TABLET ORAL
Refills: 0 | Status: ACTIVE | COMMUNITY

## 2024-04-16 RX ORDER — SITAGLIPTIN 100 MG/1
TABLET, FILM COATED ORAL
Refills: 0 | Status: DISCONTINUED | COMMUNITY
End: 2024-04-16

## 2024-04-16 NOTE — PHYSICAL EXAM
[Alert] : alert [Normal Voice/Communication] : normal voice/communication [Healthy Appearing] : healthy appearing [No Acute Distress] : no acute distress [Sclera] : the sclera and conjunctiva were normal [Hearing Threshold Finger Rub Not [place]] : hearing was normal [Normal Lips/Gums] : the lips and gums were normal [Oropharynx] : the oropharynx was normal [Normal Appearance] : the appearance of the neck was normal [No Neck Mass] : no neck mass was observed [No Respiratory Distress] : no respiratory distress [No Acc Muscle Use] : no accessory muscle use [Respiration, Rhythm And Depth] : normal respiratory rhythm and effort [Auscultation Breath Sounds / Voice Sounds] : lungs were clear to auscultation bilaterally [Heart Rate And Rhythm] : heart rate was normal and rhythm regular [Normal S1, S2] : normal S1 and S2 [Murmurs] : no murmurs [Bowel Sounds] : normal bowel sounds [Abdomen Tenderness] : non-tender [No Masses] : no abdominal mass palpated [Abdomen Soft] : soft [] : no hepatosplenomegaly [Oriented To Time, Place, And Person] : oriented to person, place, and time [de-identified] : Well-developed well-nourished.  Large frame and build.  Short neck. [FreeTextEntry1] : Anicteric sclera. [de-identified] : Moist mucosa.  No pharyngitis. [de-identified] : No adenopathy. [de-identified] : Flat soft bowel sounds present.  No organomegaly.  No mass tenderness or rebound.

## 2024-04-16 NOTE — HISTORY OF PRESENT ILLNESS
[de-identified] : EGD normal 9/21.  Path negative. [FreeTextEntry1] : 2014 .  personal history of colon polyp dating 9 years ago \par  6/21 colonoscopy normal.  Internal hemorrhoids moderate noted.

## 2024-04-16 NOTE — ASSESSMENT
[FreeTextEntry1] : Fatty liver.  Minor transaminitis.  Prior S0F0.  On FibroScan done 1 year ago 4/23. Vague right upper quadrant discomfort.  Not meal related. Plan repeat abdominal sonogram.  Call for results.  Maintain low-fat diet.  Maintain weight reduction.   GI office follow-up here in 1 year.

## 2024-04-27 ENCOUNTER — APPOINTMENT (OUTPATIENT)
Dept: ULTRASOUND IMAGING | Facility: CLINIC | Age: 54
End: 2024-04-27
Payer: COMMERCIAL

## 2024-04-27 ENCOUNTER — OUTPATIENT (OUTPATIENT)
Dept: OUTPATIENT SERVICES | Facility: HOSPITAL | Age: 54
LOS: 1 days | End: 2024-04-27
Payer: COMMERCIAL

## 2024-04-27 DIAGNOSIS — Z00.8 ENCOUNTER FOR OTHER GENERAL EXAMINATION: ICD-10-CM

## 2024-04-27 DIAGNOSIS — R10.13 EPIGASTRIC PAIN: ICD-10-CM

## 2024-04-27 DIAGNOSIS — Z98.89 OTHER SPECIFIED POSTPROCEDURAL STATES: Chronic | ICD-10-CM

## 2024-04-27 PROCEDURE — 76705 ECHO EXAM OF ABDOMEN: CPT

## 2024-04-27 PROCEDURE — 76705 ECHO EXAM OF ABDOMEN: CPT | Mod: 26

## 2024-05-03 ENCOUNTER — APPOINTMENT (OUTPATIENT)
Dept: COLORECTAL SURGERY | Facility: CLINIC | Age: 54
End: 2024-05-03
Payer: COMMERCIAL

## 2024-05-03 DIAGNOSIS — K64.8 OTHER HEMORRHOIDS: ICD-10-CM

## 2024-05-03 PROCEDURE — 46221 LIGATION OF HEMORRHOID(S): CPT

## 2024-05-03 PROCEDURE — 99213 OFFICE O/P EST LOW 20 MIN: CPT | Mod: 25

## 2024-05-03 NOTE — PHYSICAL EXAM
[Excoriation] : no perianal excoriation [Normal] : was normal [None] : there was no rectal mass  [Gross Blood] : no gross blood [Respiratory Effort] : normal respiratory effort [Calm] : calm [de-identified] : grade 2 internal hemorrhoids [de-identified] : Well-appearing, in no distress [de-identified] : Normocephalic, atraumatic [de-identified] : Moves extremities without difficulty [de-identified] : Warm and dry [de-identified] : Alert and oriented x 3

## 2024-05-03 NOTE — PLAN
[TextEntry] : 53-year-old male with symptomatic hemorrhoids for which banding was performed as described above.  Postprocedure instructions provided.  Continue high-fiber diet, increase water intake and avoid straining with bowel movements.  Follow-up as needed

## 2024-05-14 ENCOUNTER — APPOINTMENT (OUTPATIENT)
Dept: ORTHOPEDIC SURGERY | Facility: CLINIC | Age: 54
End: 2024-05-14
Payer: COMMERCIAL

## 2024-05-14 DIAGNOSIS — M25.531 PAIN IN RIGHT WRIST: ICD-10-CM

## 2024-05-14 DIAGNOSIS — M25.532 PAIN IN RIGHT WRIST: ICD-10-CM

## 2024-05-14 DIAGNOSIS — M19.049 PRIMARY OSTEOARTHRITIS, UNSPECIFIED HAND: ICD-10-CM

## 2024-05-14 DIAGNOSIS — M65.4 RADIAL STYLOID TENOSYNOVITIS [DE QUERVAIN]: ICD-10-CM

## 2024-05-14 PROCEDURE — 20600 DRAIN/INJ JOINT/BURSA W/O US: CPT | Mod: 50

## 2024-05-14 PROCEDURE — 20550 NJX 1 TENDON SHEATH/LIGAMENT: CPT | Mod: 50,59

## 2024-05-14 PROCEDURE — 99214 OFFICE O/P EST MOD 30 MIN: CPT | Mod: 25

## 2024-05-14 NOTE — HISTORY OF PRESENT ILLNESS
[FreeTextEntry1] : Robert is a 53-year-old male who presents today for follow-up for chronic exacerbated bilateral wrist and thumb discomfort.  The symptoms are bothersome especially with activities that involve pinch and .  The symptoms are affecting his ADLs.

## 2024-05-14 NOTE — PHYSICAL EXAM
[de-identified] : Examination at the level of the [bilateral] wrist reveals tenderness at the level of the first dorsal compartment with a positive finkelstein.   Examination of the [bilateral] thumb basal joint reveals pain with compression of the CMC joint with a positive grind test for pain.

## 2024-05-14 NOTE — ASSESSMENT
[FreeTextEntry1] : ASSESSMENT: The patient comes in today for follow-up for chronic exacerbated bilateral wrist and thumb discomfort.  The symptoms are bothersome especially with activities that involve pinch and .  The symptoms are affecting his ADLs.  Symptoms are consistent with bilateral basal joint arthropathy and bilateral de Quervain's tenosynovitis.  Treatment modalities were discussed, the patient elects for injections.   The patient was adequately and thoroughly informed of my assessment of their current condition(s).  - This may diminish bodily function for the extremity.  We discussed prognosis, treatment modalities including operative and nonoperative options for the above diagnostic assessment. As always, 2nd opinion is always provided as an option. For this, when accessible, I was able to review other physicians note(s) including reviewing other tests, imaging results as well as personally view these results for my own interpretation.      Injection:   The risks and benefits of a steroid injection were discussed in detail. The risks include but are not limited to: pain, infection, swelling, flare response, bleeding, subcutaneous fat atrophy, skin depigmentation and/or elevation of blood sugar. The risk of incomplete resolution of symptoms, recurrence and additional intervention was reviewed and considered by the patient.  The patient agreed to proceed and under a sterile prep, I injected 1 unit (6mg) into 1 cc of a combination of Celestone and Lidocaine into the [bilateral basal joint, bilateral de Quervain's]. The patient tolerated the injection well.   The patient was adequately and thoroughly informed of my assessment of their current condition(s).    DISCUSSION: 1.  Injections as above.  Activity modifications.  Follow-up in 2 months. 2. [x] 3. [x]

## 2024-05-28 ENCOUNTER — APPOINTMENT (OUTPATIENT)
Dept: ORTHOPEDIC SURGERY | Facility: CLINIC | Age: 54
End: 2024-05-28

## 2024-07-16 ENCOUNTER — APPOINTMENT (OUTPATIENT)
Dept: ORTHOPEDIC SURGERY | Facility: CLINIC | Age: 54
End: 2024-07-16

## 2024-07-16 DIAGNOSIS — M65.9 SYNOVITIS AND TENOSYNOVITIS, UNSPECIFIED: ICD-10-CM

## 2024-07-16 DIAGNOSIS — M19.049 PRIMARY OSTEOARTHRITIS, UNSPECIFIED HAND: ICD-10-CM

## 2024-07-16 DIAGNOSIS — M25.532 PAIN IN RIGHT WRIST: ICD-10-CM

## 2024-07-16 DIAGNOSIS — M65.4 RADIAL STYLOID TENOSYNOVITIS [DE QUERVAIN]: ICD-10-CM

## 2024-07-16 DIAGNOSIS — M25.531 PAIN IN RIGHT WRIST: ICD-10-CM

## 2024-07-16 DIAGNOSIS — S69.80XD OTHER SPECIFIED INJURIES OF UNSPECIFIED WRIST, HAND AND FINGER(S), SUBSEQUENT ENCOUNTER: ICD-10-CM

## 2024-07-16 PROCEDURE — 20550 NJX 1 TENDON SHEATH/LIGAMENT: CPT | Mod: 59,RT

## 2024-07-16 PROCEDURE — 99214 OFFICE O/P EST MOD 30 MIN: CPT | Mod: 25

## 2024-07-16 PROCEDURE — 20600 DRAIN/INJ JOINT/BURSA W/O US: CPT | Mod: 59,RT

## 2024-07-16 PROCEDURE — 20605 DRAIN/INJ JOINT/BURSA W/O US: CPT | Mod: 59,RT

## 2024-08-15 NOTE — ED ADULT NURSE NOTE - CAS TRG GEN SKIN COLOR
[FreeTextEntry3] : Date of Service: 08/16/2024   Account: [id number]   Patient: MARLON SHEA   YOB: 1986   Age: 38 year     Surgeon: Franco Soriano M.D.   Assistant: None.   Pre-Operative Diagnosis: Lumbosacral radiculitis   Post Operative Diagnosis: Lumbosacral radiculitis   Procedure: Right L4-5, L5-S1 transforaminal epidural steroid injection under fluoroscopic guidance        This procedure was carried out using fluoroscopic guidance.  The risks and benefits of the procedure were discussed extensively with the patient.  The consent of the patient was obtained and the following procedure was performed. The patient was placed in the prone position on the fluoroscopic table and the lumbar area was prepped and draped in a sterile fashion.   The right L4-5 and L5-S1 neural foramen were identified on right oblique  "pedrito dog" anatomical view at the 6 o' clock position using fluoroscopic guidance, and the area was marked. The overlying skin and subcutaneous structures were anesthetized using sterile technique with 1% Lidocaine.  A 22 gauge spinal needle was directed toward the inferior (6 o'clock) position of the pedicle, which formed the roof of the identified foramen.  Once in the epidural space, after negative aspiration for heme and CSF, 1cc of Omnipaque contrast was injected to confirm epidural location and assess filling defects and rule out intravascular needle placement.   The following contrast flow and epidurogram was observed: no intravascular or intrathecal flow pattern was noted.  No blood or CSF was aspirated. Omnipaque spread appeared to outline the right L4 and L5 nerve roots and spread medially into the epidural space.   After this, an injectate of 3 cc preservative free normal saline plus 40 mg of kenalog was injected in the epidural space at each of the two levels.   The needle was subsequently removed.  Vital signs remained normal.  Pulse oximeter was used throughout the procedure and the patient's pulse and oxygen saturation remained within normal limits.  The patient tolerated the procedure well.  There were no complications.  The patient was instructed to apply ice over the injection sites for twenty minutes every two hours for the next 24 to 48 hours.  The patient was also instructed to contact me immediately if there were any problems.     Franco Soriano M.D.  Normal for race

## 2024-09-17 ENCOUNTER — APPOINTMENT (OUTPATIENT)
Dept: ORTHOPEDIC SURGERY | Facility: CLINIC | Age: 54
End: 2024-09-17

## 2024-09-25 ENCOUNTER — APPOINTMENT (OUTPATIENT)
Dept: ORTHOPEDIC SURGERY | Facility: CLINIC | Age: 54
End: 2024-09-25
Payer: COMMERCIAL

## 2024-09-25 DIAGNOSIS — M65.4 RADIAL STYLOID TENOSYNOVITIS [DE QUERVAIN]: ICD-10-CM

## 2024-09-25 DIAGNOSIS — M19.049 PRIMARY OSTEOARTHRITIS, UNSPECIFIED HAND: ICD-10-CM

## 2024-09-25 DIAGNOSIS — M25.532 PAIN IN RIGHT WRIST: ICD-10-CM

## 2024-09-25 DIAGNOSIS — M25.531 PAIN IN RIGHT WRIST: ICD-10-CM

## 2024-09-25 PROCEDURE — 20550 NJX 1 TENDON SHEATH/LIGAMENT: CPT | Mod: 50

## 2024-09-25 PROCEDURE — 99214 OFFICE O/P EST MOD 30 MIN: CPT | Mod: 25

## 2024-09-25 PROCEDURE — 20605 DRAIN/INJ JOINT/BURSA W/O US: CPT | Mod: 50,59

## 2024-09-25 NOTE — HISTORY OF PRESENT ILLNESS
[FreeTextEntry1] : Robert returns for follow-up with known history of bilateral wrist and hand discomfort.  He continues to describe difficulty with  and pinch.  Bracing has been helpful.  Prior injections have been helpful.  He has flareup of symptoms bilaterally he states.  These inhibit ADLs

## 2024-09-25 NOTE — PHYSICAL EXAM
[de-identified] : Examination at the level of the [bilateral] wrist reveals tenderness at the level of the first dorsal compartment with a positive finkelstein. Examination of the [bilateral] thumb basal joint reveals pain with compression of the CMC joint with a positive grind test for pain.  [Adjacent thumb MCP joint is stable without hyperextension] [de-identified] : [4] views of [bilateral hands and wrists] were reviewed today in my office and were seen by me and discussed with the patient.  These [show findings consistent with bilateral basal joint OA and findings of IP joint OA]

## 2024-09-25 NOTE — ASSESSMENT
[FreeTextEntry1] : ASSESSMENT: The patient comes in today with chronic recurring exacerbated symptoms of basal joint related arthropathy discomfort as well as tendinopathy.  With this in mind he has done well with prior injection and bracing.  We have discussed activity modification.  At this point he would like to proceed with repeat injections.   The patient was adequately and thoroughly informed of my assessment of their current condition(s).  - This may diminish bodily function for the extremity. We discussed prognosis, tx modalities including operative and nonoperative options for the above diagnostic assessment. As always, 2nd opinion is always provided as an option.  When accessible, I was able to review other physicians note(s) including reviewing other tests, imaging results as well as personally view these results for my own interpretation.   Injection:   The risks and benefits of a steroid injection were discussed in detail. The risks include but are not limited to: pain, infection, swelling, flare response, bleeding, subcutaneous fat atrophy, skin depigmentation and/or elevation of blood sugar. The risk of incomplete resolution of symptoms, recurrence and additional intervention was reviewed and considered by the patient. The patient agreed to proceed and under a sterile prep, I injected 1 unit 6mg into 1 cc of a combination of Celestone and Lidocaine into the bilateral basal joint, bilateral de Quervain's. The patient tolerated the injection well.  The patient was adequately and thoroughly informed of my assessment of their current condition(s).  DISCUSSION: 1.  Injections as above.  Bracing as needed.  Follow-up 3 months as requested 2. [x] 3. [x]

## 2024-11-27 ENCOUNTER — APPOINTMENT (OUTPATIENT)
Dept: ORTHOPEDIC SURGERY | Facility: CLINIC | Age: 54
End: 2024-11-27
Payer: COMMERCIAL

## 2024-11-27 VITALS
HEART RATE: 82 BPM | DIASTOLIC BLOOD PRESSURE: 88 MMHG | WEIGHT: 160 LBS | HEIGHT: 64 IN | SYSTOLIC BLOOD PRESSURE: 145 MMHG | BODY MASS INDEX: 27.31 KG/M2

## 2024-11-27 DIAGNOSIS — M25.532 PAIN IN RIGHT WRIST: ICD-10-CM

## 2024-11-27 DIAGNOSIS — S69.80XD OTHER SPECIFIED INJURIES OF UNSPECIFIED WRIST, HAND AND FINGER(S), SUBSEQUENT ENCOUNTER: ICD-10-CM

## 2024-11-27 DIAGNOSIS — M19.049 PRIMARY OSTEOARTHRITIS, UNSPECIFIED HAND: ICD-10-CM

## 2024-11-27 DIAGNOSIS — M25.531 PAIN IN RIGHT WRIST: ICD-10-CM

## 2024-11-27 DIAGNOSIS — M65.90 UNSPECIFIED SYNOVITIS AND TENOSYNOVITIS, UNSPECIFIED SITE: ICD-10-CM

## 2024-11-27 DIAGNOSIS — M65.4 RADIAL STYLOID TENOSYNOVITIS [DE QUERVAIN]: ICD-10-CM

## 2024-11-27 PROCEDURE — 20600 DRAIN/INJ JOINT/BURSA W/O US: CPT | Mod: 59,RT

## 2024-11-27 PROCEDURE — 20550 NJX 1 TENDON SHEATH/LIGAMENT: CPT | Mod: RT

## 2024-11-27 PROCEDURE — 99214 OFFICE O/P EST MOD 30 MIN: CPT | Mod: 25

## 2024-11-27 PROCEDURE — 20605 DRAIN/INJ JOINT/BURSA W/O US: CPT | Mod: 59,RT

## 2025-02-07 NOTE — ED ADULT NURSE NOTE - DOES PATIENT HAVE ADVANCE DIRECTIVE
Patient Education        Neck Spasm: Exercises  Introduction  Here are some examples of exercises for you to try. The exercises may be suggested for a condition or for rehabilitation. Start each exercise slowly. Ease off the exercises if you start to have pain.  You will be told when to start these exercises and which ones will work best for you.  How to do the exercises  Levator scapula stretch    Sit in a firm chair, or stand up straight. If you're standing, keep your feet about hip-width apart.  Gently tilt your head toward one shoulder.  Turn your head to look down into your armpit, bending your head slightly forward. Relax and let the weight of your head stretch your neck muscles.  Hold for 15 to 30 seconds.  Repeat 2 to 4 times.  It's a good idea to repeat these steps toward your other shoulder.  Neck stretch to the side (upper trap stretch)    Sit in a firm chair, or stand up straight. Keep your shoulder down as you lean away from it. To help you remember to do this, start by relaxing your shoulders and lightly holding on to your thighs or your chair.  Look straight ahead. Tilt your head toward one shoulder and hold for 15 to 30 seconds. Relax and let the weight of your head stretch your muscles.  Slowly return your head to the starting position.  Repeat 2 to 4 times toward each shoulder.  If you would like a little added stretch, place your arm behind your back. Use the arm opposite of the direction you are tilting your head. For example, if you are tilting your head to the left, place your right arm behind your back.  You can also add more stretch by using one hand to pull your head toward your shoulder. For example, keeping your right shoulder down, lean your head to the left and use your left hand to gently and steadily pull your head toward your shoulder.  Neck rotation    Sit up straight in a firm chair, or stand up straight. If you're standing, keep your feet about hip-width apart.  Keeping your chin 
No

## 2025-03-04 ENCOUNTER — APPOINTMENT (OUTPATIENT)
Dept: ORTHOPEDIC SURGERY | Facility: CLINIC | Age: 55
End: 2025-03-04

## 2025-06-10 ENCOUNTER — APPOINTMENT (OUTPATIENT)
Dept: GASTROENTEROLOGY | Facility: CLINIC | Age: 55
End: 2025-06-10
Payer: COMMERCIAL

## 2025-06-10 VITALS
RESPIRATION RATE: 16 BRPM | SYSTOLIC BLOOD PRESSURE: 120 MMHG | WEIGHT: 160 LBS | HEIGHT: 64 IN | BODY MASS INDEX: 27.31 KG/M2 | HEART RATE: 70 BPM | OXYGEN SATURATION: 98 % | DIASTOLIC BLOOD PRESSURE: 70 MMHG

## 2025-06-10 PROBLEM — K21.9 GASTROESOPHAGEAL REFLUX DISEASE WITHOUT ESOPHAGITIS: Status: ACTIVE | Noted: 2025-06-10

## 2025-06-10 PROCEDURE — 99213 OFFICE O/P EST LOW 20 MIN: CPT

## 2025-06-10 RX ORDER — OMEPRAZOLE 40 MG/1
40 CAPSULE, DELAYED RELEASE ORAL
Qty: 90 | Refills: 3 | Status: ACTIVE | COMMUNITY
Start: 2025-06-10 | End: 1900-01-01

## 2025-06-13 RX ORDER — OMEPRAZOLE 40 MG/1
40 CAPSULE, DELAYED RELEASE ORAL
Qty: 90 | Refills: 3 | Status: ACTIVE | COMMUNITY
Start: 2025-06-13

## 2025-07-08 ENCOUNTER — TRANSCRIPTION ENCOUNTER (OUTPATIENT)
Age: 55
End: 2025-07-08

## 2025-07-08 RX ORDER — OMEPRAZOLE 40 MG/1
40 CAPSULE, DELAYED RELEASE ORAL
Qty: 90 | Refills: 1 | Status: ACTIVE | COMMUNITY
Start: 2025-07-08 | End: 1900-01-01

## 2025-08-15 ENCOUNTER — APPOINTMENT (OUTPATIENT)
Dept: COLORECTAL SURGERY | Facility: CLINIC | Age: 55
End: 2025-08-15
Payer: COMMERCIAL

## 2025-08-15 VITALS — HEIGHT: 64 IN | BODY MASS INDEX: 27.31 KG/M2 | WEIGHT: 160 LBS | RESPIRATION RATE: 14 BRPM

## 2025-08-15 DIAGNOSIS — K64.8 OTHER HEMORRHOIDS: ICD-10-CM

## 2025-08-15 PROCEDURE — 99213 OFFICE O/P EST LOW 20 MIN: CPT | Mod: 25

## 2025-08-15 PROCEDURE — 46221 LIGATION OF HEMORRHOID(S): CPT
